# Patient Record
Sex: MALE | Race: WHITE | Employment: OTHER | URBAN - METROPOLITAN AREA
[De-identification: names, ages, dates, MRNs, and addresses within clinical notes are randomized per-mention and may not be internally consistent; named-entity substitution may affect disease eponyms.]

---

## 2020-09-21 ENCOUNTER — APPOINTMENT (OUTPATIENT)
Dept: GENERAL RADIOLOGY | Age: 60
End: 2020-09-21
Attending: PHYSICIAN ASSISTANT
Payer: COMMERCIAL

## 2020-09-21 ENCOUNTER — HOSPITAL ENCOUNTER (EMERGENCY)
Age: 60
Discharge: HOME | End: 2020-09-21
Attending: EMERGENCY MEDICINE | Admitting: EMERGENCY MEDICINE
Payer: COMMERCIAL

## 2020-09-21 ENCOUNTER — APPOINTMENT (OUTPATIENT)
Dept: CT IMAGING | Age: 60
End: 2020-09-21
Attending: PHYSICIAN ASSISTANT
Payer: COMMERCIAL

## 2020-09-21 VITALS
HEIGHT: 72 IN | TEMPERATURE: 98.4 F | OXYGEN SATURATION: 97 % | RESPIRATION RATE: 18 BRPM | BODY MASS INDEX: 23.7 KG/M2 | DIASTOLIC BLOOD PRESSURE: 74 MMHG | SYSTOLIC BLOOD PRESSURE: 133 MMHG | WEIGHT: 175 LBS | HEART RATE: 69 BPM

## 2020-09-21 DIAGNOSIS — R07.9 CHEST PAIN, UNSPECIFIED TYPE: Primary | ICD-10-CM

## 2020-09-21 DIAGNOSIS — M54.50 ACUTE RIGHT-SIDED LOW BACK PAIN WITHOUT SCIATICA: ICD-10-CM

## 2020-09-21 LAB
ALBUMIN SERPL-MCNC: 3.2 G/DL (ref 3.5–5)
ALBUMIN/GLOB SERPL: 0.7 {RATIO} (ref 1.1–2.2)
ALP SERPL-CCNC: 543 U/L (ref 45–117)
ALT SERPL-CCNC: 228 U/L (ref 12–78)
ANION GAP SERPL CALC-SCNC: 2 MMOL/L (ref 5–15)
APPEARANCE UR: CLEAR
AST SERPL-CCNC: 134 U/L (ref 15–37)
BACTERIA URNS QL MICRO: NEGATIVE /HPF
BASOPHILS # BLD: 0 K/UL (ref 0–0.1)
BASOPHILS NFR BLD: 0 % (ref 0–1)
BILIRUB SERPL-MCNC: 0.4 MG/DL (ref 0.2–1)
BILIRUB UR QL: NEGATIVE
BUN SERPL-MCNC: 15 MG/DL (ref 6–20)
BUN/CREAT SERPL: 16 (ref 12–20)
CALCIUM SERPL-MCNC: 9.6 MG/DL (ref 8.5–10.1)
CHLORIDE SERPL-SCNC: 103 MMOL/L (ref 97–108)
CO2 SERPL-SCNC: 30 MMOL/L (ref 21–32)
COLOR UR: ABNORMAL
COMMENT, HOLDF: NORMAL
CREAT SERPL-MCNC: 0.94 MG/DL (ref 0.7–1.3)
D DIMER PPP FEU-MCNC: 2.02 MG/L FEU (ref 0–0.65)
DIFFERENTIAL METHOD BLD: NORMAL
EOSINOPHIL # BLD: 0.1 K/UL (ref 0–0.4)
EOSINOPHIL NFR BLD: 1 % (ref 0–7)
EPITH CASTS URNS QL MICRO: ABNORMAL /LPF
ERYTHROCYTE [DISTWIDTH] IN BLOOD BY AUTOMATED COUNT: 13.7 % (ref 11.5–14.5)
GLOBULIN SER CALC-MCNC: 4.6 G/DL (ref 2–4)
GLUCOSE SERPL-MCNC: 113 MG/DL (ref 65–100)
GLUCOSE UR STRIP.AUTO-MCNC: NEGATIVE MG/DL
HCT VFR BLD AUTO: 44.5 % (ref 36.6–50.3)
HGB BLD-MCNC: 14.3 G/DL (ref 12.1–17)
HGB UR QL STRIP: ABNORMAL
HYALINE CASTS URNS QL MICRO: ABNORMAL /LPF (ref 0–5)
IMM GRANULOCYTES # BLD AUTO: 0 K/UL (ref 0–0.04)
IMM GRANULOCYTES NFR BLD AUTO: 0 % (ref 0–0.5)
KETONES UR QL STRIP.AUTO: NEGATIVE MG/DL
LEUKOCYTE ESTERASE UR QL STRIP.AUTO: NEGATIVE
LYMPHOCYTES # BLD: 0.9 K/UL (ref 0.8–3.5)
LYMPHOCYTES NFR BLD: 18 % (ref 12–49)
MCH RBC QN AUTO: 30.4 PG (ref 26–34)
MCHC RBC AUTO-ENTMCNC: 32.1 G/DL (ref 30–36.5)
MCV RBC AUTO: 94.7 FL (ref 80–99)
MONOCYTES # BLD: 0.6 K/UL (ref 0–1)
MONOCYTES NFR BLD: 11 % (ref 5–13)
NEUTS SEG # BLD: 3.5 K/UL (ref 1.8–8)
NEUTS SEG NFR BLD: 70 % (ref 32–75)
NITRITE UR QL STRIP.AUTO: NEGATIVE
NRBC # BLD: 0 K/UL (ref 0–0.01)
NRBC BLD-RTO: 0 PER 100 WBC
PH UR STRIP: 5.5 [PH] (ref 5–8)
PLATELET # BLD AUTO: 202 K/UL (ref 150–400)
PMV BLD AUTO: 10.9 FL (ref 8.9–12.9)
POTASSIUM SERPL-SCNC: 4.3 MMOL/L (ref 3.5–5.1)
PROT SERPL-MCNC: 7.8 G/DL (ref 6.4–8.2)
PROT UR STRIP-MCNC: NEGATIVE MG/DL
RBC # BLD AUTO: 4.7 M/UL (ref 4.1–5.7)
RBC #/AREA URNS HPF: ABNORMAL /HPF (ref 0–5)
SAMPLES BEING HELD,HOLD: NORMAL
SODIUM SERPL-SCNC: 135 MMOL/L (ref 136–145)
SP GR UR REFRACTOMETRY: 1.01 (ref 1–1.03)
TROPONIN I SERPL-MCNC: <0.05 NG/ML
UR CULT HOLD, URHOLD: NORMAL
UROBILINOGEN UR QL STRIP.AUTO: 0.2 EU/DL (ref 0.2–1)
WBC # BLD AUTO: 5 K/UL (ref 4.1–11.1)
WBC URNS QL MICRO: ABNORMAL /HPF (ref 0–4)

## 2020-09-21 PROCEDURE — 99283 EMERGENCY DEPT VISIT LOW MDM: CPT

## 2020-09-21 PROCEDURE — 71275 CT ANGIOGRAPHY CHEST: CPT

## 2020-09-21 PROCEDURE — 74011250636 HC RX REV CODE- 250/636: Performed by: PHYSICIAN ASSISTANT

## 2020-09-21 PROCEDURE — 80053 COMPREHEN METABOLIC PANEL: CPT

## 2020-09-21 PROCEDURE — 74176 CT ABD & PELVIS W/O CONTRAST: CPT

## 2020-09-21 PROCEDURE — 96374 THER/PROPH/DIAG INJ IV PUSH: CPT

## 2020-09-21 PROCEDURE — 71046 X-RAY EXAM CHEST 2 VIEWS: CPT

## 2020-09-21 PROCEDURE — 85025 COMPLETE CBC W/AUTO DIFF WBC: CPT

## 2020-09-21 PROCEDURE — 74011000258 HC RX REV CODE- 258: Performed by: RADIOLOGY

## 2020-09-21 PROCEDURE — 81001 URINALYSIS AUTO W/SCOPE: CPT

## 2020-09-21 PROCEDURE — 85379 FIBRIN DEGRADATION QUANT: CPT

## 2020-09-21 PROCEDURE — 84484 ASSAY OF TROPONIN QUANT: CPT

## 2020-09-21 PROCEDURE — 74011000636 HC RX REV CODE- 636: Performed by: RADIOLOGY

## 2020-09-21 PROCEDURE — 36415 COLL VENOUS BLD VENIPUNCTURE: CPT

## 2020-09-21 PROCEDURE — 99284 EMERGENCY DEPT VISIT MOD MDM: CPT

## 2020-09-21 RX ORDER — SODIUM CHLORIDE 0.9 % (FLUSH) 0.9 %
10 SYRINGE (ML) INJECTION
Status: COMPLETED | OUTPATIENT
Start: 2020-09-21 | End: 2020-09-21

## 2020-09-21 RX ORDER — KETOROLAC TROMETHAMINE 30 MG/ML
15 INJECTION, SOLUTION INTRAMUSCULAR; INTRAVENOUS
Status: COMPLETED | OUTPATIENT
Start: 2020-09-21 | End: 2020-09-21

## 2020-09-21 RX ADMIN — IOPAMIDOL 80 ML: 755 INJECTION, SOLUTION INTRAVENOUS at 12:19

## 2020-09-21 RX ADMIN — Medication 10 ML: at 12:19

## 2020-09-21 RX ADMIN — SODIUM CHLORIDE 100 ML: 900 INJECTION, SOLUTION INTRAVENOUS at 12:19

## 2020-09-21 RX ADMIN — KETOROLAC TROMETHAMINE 15 MG: 30 INJECTION, SOLUTION INTRAMUSCULAR at 10:45

## 2020-09-21 NOTE — ED PROVIDER NOTES
Date of Service:  9/21/2020    Patient:  Job Stevens    Chief Complaint:  Back Pain and Chest Pain       HPI:  Job Stevens is a 62 y.o.  male who presents for evaluation of right back pain, and chest pain, reminds him of kidney stone pain. Has been followed by PCP for the past month who has been evaluting him for fatigue, fevers, night sweats. Tested him for COVID 19, rheumatoid arthritis, lyme, thyroid had noticed elevated LFTs, has scheduled for CT scan of gallbladder. 3 days of right sided flank pain, feels similar to previous kidney stones, unable to get comfortable. Along with midsternal chest pain, nonradiating, feels like constriction, intermittent, no aggravating/alleviating, not worse with exertion, with some associated SOB. No nausea, vomiting, diarrhea, constipation, dysuria, hematuria. PMH: Kidney stones, HTN  Surgeries: hernia repair in July NKDA  No smoking/no drugs  Working - self employed           Past Medical History:   Diagnosis Date    Hypertension        History reviewed. No pertinent surgical history. History reviewed. No pertinent family history. Social History     Socioeconomic History    Marital status: Not on file     Spouse name: Not on file    Number of children: Not on file    Years of education: Not on file    Highest education level: Not on file   Occupational History    Not on file   Social Needs    Financial resource strain: Not on file    Food insecurity     Worry: Not on file     Inability: Not on file    Transportation needs     Medical: Not on file     Non-medical: Not on file   Tobacco Use    Smoking status: Never Smoker    Smokeless tobacco: Never Used   Substance and Sexual Activity    Alcohol use:  Yes    Drug use: Not on file    Sexual activity: Not on file   Lifestyle    Physical activity     Days per week: Not on file     Minutes per session: Not on file    Stress: Not on file   Relationships    Social connections     Talks on phone: Not on file     Gets together: Not on file     Attends Anabaptist service: Not on file     Active member of club or organization: Not on file     Attends meetings of clubs or organizations: Not on file     Relationship status: Not on file    Intimate partner violence     Fear of current or ex partner: Not on file     Emotionally abused: Not on file     Physically abused: Not on file     Forced sexual activity: Not on file   Other Topics Concern    Not on file   Social History Narrative    Not on file         ALLERGIES: Patient has no known allergies. Review of Systems   Constitutional: Negative for chills and fever. HENT: Negative for congestion and sore throat. Eyes: Negative for pain. Respiratory: Positive for shortness of breath. Negative for cough. Cardiovascular: Positive for chest pain. Negative for palpitations. Gastrointestinal: Negative for abdominal pain, diarrhea, nausea and vomiting. Genitourinary: Negative for dysuria, frequency and urgency. Musculoskeletal: Positive for back pain. Negative for neck pain. Neurological: Negative for dizziness, light-headedness and headaches. Vitals:    09/21/20 0958   BP: 130/80   Pulse: 69   Resp: 16   Temp: 98.1 °F (36.7 °C)   SpO2: 99%   Weight: 79.4 kg (175 lb)   Height: 6' (1.829 m)            Physical Exam  Vitals signs and nursing note reviewed. Constitutional:       Appearance: Normal appearance. HENT:      Head: Normocephalic and atraumatic. Nose: Nose normal.   Eyes:      Extraocular Movements: Extraocular movements intact. Conjunctiva/sclera: Conjunctivae normal.      Pupils: Pupils are equal, round, and reactive to light. Neck:      Musculoskeletal: Normal range of motion and neck supple. Cardiovascular:      Rate and Rhythm: Normal rate and regular rhythm. Pulses: Normal pulses. Radial pulses are 2+ on the right side and 2+ on the left side.         Posterior tibial pulses are 2+ on the right side and 2+ on the left side. Heart sounds: Normal heart sounds. Pulmonary:      Effort: Pulmonary effort is normal.      Breath sounds: Normal breath sounds. Abdominal:      General: Abdomen is flat. Bowel sounds are normal.      Palpations: Abdomen is soft. Tenderness: There is right CVA tenderness. There is no left CVA tenderness, guarding or rebound. Musculoskeletal: Normal range of motion. Skin:     General: Skin is warm and dry. Neurological:      General: No focal deficit present. Mental Status: He is alert and oriented to person, place, and time. Mental status is at baseline. Sensory: Sensation is intact. Motor: Motor function is intact. Coordination: Coordination is intact. Psychiatric:         Mood and Affect: Mood normal.         Behavior: Behavior normal.         Thought Content: Thought content normal.          MDM  Number of Diagnoses or Management Options  Acute right-sided low back pain without sciatica:   Chest pain, unspecified type:   Diagnosis management comments: LABS:  Recent Results (from the past 6 hour(s))  -SAMPLES BEING HELD  Collection Time: 09/21/20 10:35 AM       Result                      Value             Ref Range           SAMPLES BEING HELD          1RED                                  COMMENT                                                       Add-on orders for these samples will be processed based on acceptable specimen integrity and analyte stability, which may vary by analyte.   -CBC WITH AUTOMATED DIFF  Collection Time: 09/21/20 10:35 AM       Result                      Value             Ref Range           WBC                         5.0               4.1 - 11.1 K*       RBC                         4.70              4.10 - 5.70 *       HGB                         14.3              12.1 - 17.0 *       HCT                         44.5              36.6 - 50.3 %       MCV                         94.7              80.0 - 99.0 *       MCH 30.4              26.0 - 34.0 *       MCHC                        32.1              30.0 - 36.5 *       RDW                         13.7              11.5 - 14.5 %       PLATELET                    202               150 - 400 K/*       MPV                         10.9              8.9 - 12.9 FL       NRBC                        0.0               0  WBC       ABSOLUTE NRBC               0.00              0.00 - 0.01 *       NEUTROPHILS                 70                32 - 75 %           LYMPHOCYTES                 18                12 - 49 %           MONOCYTES                   11                5 - 13 %            EOSINOPHILS                 1                 0 - 7 %             BASOPHILS                   0                 0 - 1 %             IMMATURE GRANULOCYTES       0                 0.0 - 0.5 %         ABS. NEUTROPHILS            3.5               1.8 - 8.0 K/*       ABS. LYMPHOCYTES            0.9               0.8 - 3.5 K/*       ABS. MONOCYTES              0.6               0.0 - 1.0 K/*       ABS. EOSINOPHILS            0.1               0.0 - 0.4 K/*       ABS. BASOPHILS              0.0               0.0 - 0.1 K/*       ABS. IMM.  GRANS.            0.0               0.00 - 0.04 *       DF                          AUTOMATED                        -D DIMER  Collection Time: 09/21/20 10:35 AM       Result                      Value             Ref Range           D-dimer                     2.02 (H)          0.00 - 2.79 *  -METABOLIC PANEL, COMPREHENSIVE  Collection Time: 09/21/20 10:35 AM       Result                      Value             Ref Range           Sodium                      135 (L)           136 - 145 mm*       Potassium                   4.3               3.5 - 5.1 mm*       Chloride                    103               97 - 108 mmo*       CO2                         30                21 - 32 mmol*       Anion gap                   2 (L)             5 - 15 mmol/L       Glucose                     113 (H)           65 - 100 mg/*       BUN                         15                6 - 20 MG/DL        Creatinine                  0.94              0.70 - 1.30 *       BUN/Creatinine ratio        16                12 - 20             GFR est AA                  >60               >60 ml/min/1*       GFR est non-AA              >60               >60 ml/min/1*       Calcium                     9.6               8.5 - 10.1 M*       Bilirubin, total            0.4               0.2 - 1.0 MG*       ALT (SGPT)                  228 (H)           12 - 78 U/L         AST (SGOT)                  134 (H)           15 - 37 U/L         Alk.  phosphatase            543 (H)           45 - 117 U/L        Protein, total              7.8               6.4 - 8.2 g/*       Albumin                     3.2 (L)           3.5 - 5.0 g/*       Globulin                    4.6 (H)           2.0 - 4.0 g/*       A-G Ratio                   0.7 (L)           1.1 - 2.2      -TROPONIN I  Collection Time: 09/21/20 10:35 AM       Result                      Value             Ref Range           Troponin-I, Qt.             <0.05             <0.05 ng/mL    -URINALYSIS W/MICROSCOPIC  Collection Time: 09/21/20 10:45 AM       Result                      Value             Ref Range           Color                       YELLOW/STRAW                          Appearance                  CLEAR             CLEAR               Specific gravity            1.010             1.003 - 1.03*       pH (UA)                     5.5               5.0 - 8.0           Protein                     Negative          NEG mg/dL           Glucose                     Negative          NEG mg/dL           Ketone                      Negative          NEG mg/dL           Bilirubin                   Negative          NEG                 Blood                       TRACE (A)         NEG                 Urobilinogen                0.2 0.2 - 1.0 EU*       Nitrites                    Negative          NEG                 Leukocyte Esterase          Negative          NEG                 WBC                         0-4               0 - 4 /hpf          RBC                         0-5               0 - 5 /hpf          Epithelial cells            FEW               FEW /lpf            Bacteria                    Negative          NEG /hpf            Hyaline cast                0-2               0 - 5 /lpf     -URINE CULTURE HOLD SAMPLE  Collection Time: 09/21/20 10:45 AM  Specimen: Serum; Urine       Result                      Value             Ref Range           Urine culture hold                                            Urine on hold in Microbiology dept for 2 days. If unpreserved urine is submitted, it cannot be used for addtional testing after 24 hours, recollection will be required. IMAGING:  CTA CHEST W OR W WO CONT   Final Result    IMPRESSION: No acute process or evidence of pulmonary embolism. Nonobstructing    left renal stone. CT ABD PELV WO CONT   Final Result    IMPRESSION:    Bilateral nonobstructing renal stones. No ureteral stone or hydronephrosis. No    acute abnormality. XR CHEST PA LAT   Final Result    Impression: No acute process. Medications During Visit:  Medications  ketorolac (TORADOL) injection 15 mg (15 mg IntraVENous Given 9/21/20 1045)  sodium chloride 0.9 % bolus infusion 100 mL (0 mL IntraVENous IV Completed 9/21/20 1339)  iopamidoL (ISOVUE-370) 76 % injection 100 mL (80 mL IntraVENous Given 9/21/20 1219)  sodium chloride (NS) flush 10 mL (10 mL IntraVENous Given 9/21/20 1219)      DECISION MAKING:  Colt Randolph is a 62 y.o. male who comes in as above. 3 days right flank pain, h/o kidney stones, states this feels similar, nonradiating, no bowel/bladder incontinence, no numbness/tingling/weakness.  Also endorses midsternal nonradiating chest pain, with associated SOB, no n/v/d, no dysuria, no hematuria. Vitals stable. Patient in no acute distress. Blood work shows no clear etiology. D-dimer elevated, CTA shows no PE. CT scan abd/pelv neg shows nonobstructing renal stones. Discussed all results with patient, patient feels improved with toradol. Will continue trial of NSAIDS for back pain, with close follow up with PCP within 1 week. Return to ED if any worsening or severe symptoms. IMPRESSION:  Chest pain, unspecified type  (primary encounter diagnosis)  Acute right-sided low back pain without sciatica    DISPOSITION:  Discharged      There are no discharge medications for this patient. Follow-up Information     Follow up With Specialties Details Why Contact Info    Deandre Ching MD Family Medicine, Family Medicine Schedule an   appointment as soon as possible for a visit in 1 week  [address] [facility] [id number] [phone]      [address] [facility] Emergency Medicine Go to  If symptoms worsen [address] [facility]  [phone]          The patient is asked to follow-up with their primary care provider in the next several days. They are to call tomorrow for an appointment. The patient is asked to return promptly for any increased concerns or worsening of symptoms. They can return to this emergency department or any other emergency department.            Procedures

## 2020-09-21 NOTE — ED TRIAGE NOTES
Patient reports not feeling well for weeks. Saw PCP last week and PCP thinks \"there is something wrong with my gallbladder. In a couple weeks im scheduled to get a CT on my gallbladder. \" Reports midline chest pain, low back pain, and trouble fully emptying bladder. Denies shortness of breath.

## 2020-09-26 ENCOUNTER — HOSPITAL ENCOUNTER (EMERGENCY)
Age: 60
Discharge: HOME | End: 2020-09-26
Attending: STUDENT IN AN ORGANIZED HEALTH CARE EDUCATION/TRAINING PROGRAM

## 2020-09-26 VITALS
SYSTOLIC BLOOD PRESSURE: 154 MMHG | OXYGEN SATURATION: 96 % | TEMPERATURE: 98.6 F | DIASTOLIC BLOOD PRESSURE: 89 MMHG | HEART RATE: 76 BPM | RESPIRATION RATE: 16 BRPM

## 2020-09-26 DIAGNOSIS — M25.50 ARTHRALGIA, UNSPECIFIED JOINT: ICD-10-CM

## 2020-09-26 DIAGNOSIS — A69.20 LYME DISEASE: Primary | ICD-10-CM

## 2020-09-26 DIAGNOSIS — R79.89 ELEVATED LFTS: ICD-10-CM

## 2020-09-26 DIAGNOSIS — L27.0 DRUG RASH: ICD-10-CM

## 2020-09-26 LAB
ALBUMIN SERPL-MCNC: 3.2 G/DL (ref 3.5–5)
ALBUMIN/GLOB SERPL: 0.6 {RATIO} (ref 1.1–2.2)
ALP SERPL-CCNC: 603 U/L (ref 45–117)
ALT SERPL-CCNC: 267 U/L (ref 12–78)
ANION GAP SERPL CALC-SCNC: 3 MMOL/L (ref 5–15)
APPEARANCE UR: ABNORMAL
AST SERPL-CCNC: 154 U/L (ref 15–37)
BACTERIA URNS QL MICRO: NEGATIVE /HPF
BASOPHILS # BLD: 0 K/UL (ref 0–0.1)
BASOPHILS NFR BLD: 0 % (ref 0–1)
BILIRUB SERPL-MCNC: 0.6 MG/DL (ref 0.2–1)
BILIRUB UR QL: NEGATIVE
BUN SERPL-MCNC: 15 MG/DL (ref 6–20)
BUN/CREAT SERPL: 18 (ref 12–20)
CALCIUM SERPL-MCNC: 9.6 MG/DL (ref 8.5–10.1)
CAOX CRY URNS QL MICRO: ABNORMAL
CHLORIDE SERPL-SCNC: 102 MMOL/L (ref 97–108)
CO2 SERPL-SCNC: 31 MMOL/L (ref 21–32)
COLOR UR: ABNORMAL
COMMENT, HOLDF: NORMAL
CREAT SERPL-MCNC: 0.85 MG/DL (ref 0.7–1.3)
DIFFERENTIAL METHOD BLD: NORMAL
EOSINOPHIL # BLD: 0.1 K/UL (ref 0–0.4)
EOSINOPHIL NFR BLD: 1 % (ref 0–7)
EPITH CASTS URNS QL MICRO: ABNORMAL /LPF
ERYTHROCYTE [DISTWIDTH] IN BLOOD BY AUTOMATED COUNT: 14 % (ref 11.5–14.5)
GLOBULIN SER CALC-MCNC: 5 G/DL (ref 2–4)
GLUCOSE SERPL-MCNC: 102 MG/DL (ref 65–100)
GLUCOSE UR STRIP.AUTO-MCNC: NEGATIVE MG/DL
HCT VFR BLD AUTO: 42.8 % (ref 36.6–50.3)
HGB BLD-MCNC: 14.1 G/DL (ref 12.1–17)
HGB UR QL STRIP: ABNORMAL
IMM GRANULOCYTES # BLD AUTO: 0 K/UL (ref 0–0.04)
IMM GRANULOCYTES NFR BLD AUTO: 0 % (ref 0–0.5)
KETONES UR QL STRIP.AUTO: ABNORMAL MG/DL
LEUKOCYTE ESTERASE UR QL STRIP.AUTO: ABNORMAL
LIPASE SERPL-CCNC: 65 U/L (ref 73–393)
LYMPHOCYTES # BLD: 1 K/UL (ref 0.8–3.5)
LYMPHOCYTES NFR BLD: 13 % (ref 12–49)
MAGNESIUM SERPL-MCNC: 2 MG/DL (ref 1.6–2.4)
MCH RBC QN AUTO: 30.5 PG (ref 26–34)
MCHC RBC AUTO-ENTMCNC: 32.9 G/DL (ref 30–36.5)
MCV RBC AUTO: 92.4 FL (ref 80–99)
MONOCYTES # BLD: 0.8 K/UL (ref 0–1)
MONOCYTES NFR BLD: 11 % (ref 5–13)
NEUTS SEG # BLD: 5.7 K/UL (ref 1.8–8)
NEUTS SEG NFR BLD: 75 % (ref 32–75)
NITRITE UR QL STRIP.AUTO: NEGATIVE
NRBC # BLD: 0 K/UL (ref 0–0.01)
NRBC BLD-RTO: 0 PER 100 WBC
PH UR STRIP: 6 [PH] (ref 5–8)
PLATELET # BLD AUTO: 285 K/UL (ref 150–400)
PMV BLD AUTO: 10.8 FL (ref 8.9–12.9)
POTASSIUM SERPL-SCNC: 3.8 MMOL/L (ref 3.5–5.1)
PROT SERPL-MCNC: 8.2 G/DL (ref 6.4–8.2)
PROT UR STRIP-MCNC: 30 MG/DL
RBC # BLD AUTO: 4.63 M/UL (ref 4.1–5.7)
RBC #/AREA URNS HPF: ABNORMAL /HPF (ref 0–5)
SAMPLES BEING HELD,HOLD: NORMAL
SODIUM SERPL-SCNC: 136 MMOL/L (ref 136–145)
SP GR UR REFRACTOMETRY: 1.02 (ref 1–1.03)
TROPONIN I SERPL-MCNC: <0.05 NG/ML
UR CULT HOLD, URHOLD: NORMAL
UROBILINOGEN UR QL STRIP.AUTO: 1 EU/DL (ref 0.2–1)
WBC # BLD AUTO: 7.6 K/UL (ref 4.1–11.1)
WBC URNS QL MICRO: ABNORMAL /HPF (ref 0–4)

## 2020-09-26 PROCEDURE — 83735 ASSAY OF MAGNESIUM: CPT

## 2020-09-26 PROCEDURE — 74011000258 HC RX REV CODE- 258: Performed by: PHYSICIAN ASSISTANT

## 2020-09-26 PROCEDURE — 74011250637 HC RX REV CODE- 250/637: Performed by: PHYSICIAN ASSISTANT

## 2020-09-26 PROCEDURE — 80053 COMPREHEN METABOLIC PANEL: CPT

## 2020-09-26 PROCEDURE — 96375 TX/PRO/DX INJ NEW DRUG ADDON: CPT

## 2020-09-26 PROCEDURE — 81001 URINALYSIS AUTO W/SCOPE: CPT

## 2020-09-26 PROCEDURE — 84484 ASSAY OF TROPONIN QUANT: CPT

## 2020-09-26 PROCEDURE — 96372 THER/PROPH/DIAG INJ SC/IM: CPT

## 2020-09-26 PROCEDURE — 74011250636 HC RX REV CODE- 250/636: Performed by: PHYSICIAN ASSISTANT

## 2020-09-26 PROCEDURE — 36415 COLL VENOUS BLD VENIPUNCTURE: CPT

## 2020-09-26 PROCEDURE — 96365 THER/PROPH/DIAG IV INF INIT: CPT

## 2020-09-26 PROCEDURE — 99284 EMERGENCY DEPT VISIT MOD MDM: CPT

## 2020-09-26 PROCEDURE — 85025 COMPLETE CBC W/AUTO DIFF WBC: CPT

## 2020-09-26 PROCEDURE — 93005 ELECTROCARDIOGRAM TRACING: CPT

## 2020-09-26 PROCEDURE — 83690 ASSAY OF LIPASE: CPT

## 2020-09-26 RX ORDER — HYDROMORPHONE HYDROCHLORIDE 2 MG/1
2 TABLET ORAL
Qty: 10 TAB | Refills: 0 | Status: SHIPPED | OUTPATIENT
Start: 2020-09-26 | End: 2020-09-28

## 2020-09-26 RX ORDER — HYDROMORPHONE HYDROCHLORIDE 1 MG/ML
INJECTION, SOLUTION INTRAMUSCULAR; INTRAVENOUS; SUBCUTANEOUS
Status: DISPENSED
Start: 2020-09-26 | End: 2020-09-27

## 2020-09-26 RX ORDER — HYDROMORPHONE HYDROCHLORIDE 2 MG/1
TABLET ORAL
Status: DISPENSED
Start: 2020-09-26 | End: 2020-09-27

## 2020-09-26 RX ORDER — HYDROMORPHONE HYDROCHLORIDE 2 MG/1
2 TABLET ORAL
Status: COMPLETED | OUTPATIENT
Start: 2020-09-26 | End: 2020-09-26

## 2020-09-26 RX ORDER — SODIUM CHLORIDE 900 MG/100ML
INJECTION INTRAVENOUS
Status: DISPENSED
Start: 2020-09-26 | End: 2020-09-27

## 2020-09-26 RX ORDER — CEFUROXIME AXETIL 500 MG/1
500 TABLET ORAL 2 TIMES DAILY
Qty: 42 TAB | Refills: 0 | Status: SHIPPED | OUTPATIENT
Start: 2020-09-26 | End: 2020-10-17

## 2020-09-26 RX ORDER — KETOROLAC TROMETHAMINE 30 MG/ML
30 INJECTION, SOLUTION INTRAMUSCULAR; INTRAVENOUS
Status: COMPLETED | OUTPATIENT
Start: 2020-09-26 | End: 2020-09-26

## 2020-09-26 RX ORDER — KETOROLAC TROMETHAMINE 30 MG/ML
INJECTION, SOLUTION INTRAMUSCULAR; INTRAVENOUS
Status: DISPENSED
Start: 2020-09-26 | End: 2020-09-27

## 2020-09-26 RX ORDER — CEFTRIAXONE 2 G/1
INJECTION, POWDER, FOR SOLUTION INTRAMUSCULAR; INTRAVENOUS
Status: DISPENSED
Start: 2020-09-26 | End: 2020-09-27

## 2020-09-26 RX ORDER — HYDROMORPHONE HYDROCHLORIDE 1 MG/ML
1 INJECTION, SOLUTION INTRAMUSCULAR; INTRAVENOUS; SUBCUTANEOUS
Status: COMPLETED | OUTPATIENT
Start: 2020-09-26 | End: 2020-09-26

## 2020-09-26 RX ADMIN — CEFTRIAXONE SODIUM 2 G: 2 INJECTION, POWDER, FOR SOLUTION INTRAMUSCULAR; INTRAVENOUS at 21:11

## 2020-09-26 RX ADMIN — HYDROMORPHONE HYDROCHLORIDE 2 MG: 2 TABLET ORAL at 20:54

## 2020-09-26 RX ADMIN — KETOROLAC TROMETHAMINE 30 MG: 30 INJECTION, SOLUTION INTRAMUSCULAR at 20:54

## 2020-09-26 RX ADMIN — SODIUM CHLORIDE 1000 ML: 9 INJECTION, SOLUTION INTRAVENOUS at 20:21

## 2020-09-26 RX ADMIN — HYDROMORPHONE HYDROCHLORIDE 1 MG: 1 INJECTION, SOLUTION INTRAMUSCULAR; INTRAVENOUS; SUBCUTANEOUS at 22:24

## 2020-09-26 NOTE — ED PROVIDER NOTES
59yo male with PMH of HTN, recent lyme disease diagnosis presents with generalized rash x yesterday, worsening bilateral hip, knee, elbow joint pain despite oxycodone. He states sx's began 2-3 weeks ago with generalized fatigue, bilateral knee and elbow joint pain and intermittent fever. Saw PCP who ordered multiple labs, checked for RA, mono, hepatitis panel all of which were negative. Over the last weekend had \"flares\" of back pain and was seen in the ED on Monday. Also c/o chest pain intermittent for 1 week, states it was constant, had labs, CT chest/abd/pelvis had neg CXR, neg CTA chest, bilateral non-obstructing renal stones, had elevated LFTs. Lyme disease resulted positive earlier this week. States he has \"flares\" where he has body aches but resolves, but states \"costant body pain 24/7\". States he had an ABD US on Wed, ordered by GI, 3 days ago on Wed, states his PCP read it and was told it was normal. He saw GI Dr. Kwan Sanchez, on Thursday, was told to get an MRI and states \"he told me there might be a clogged bile duct\". Had labs drawn, and alk phos was increasing at 643, LFTs trending down now compared to ED visits labs. States intermittent headaches for the duration of sx's, 2-3 weeks, states currently no headache. He has had no fever for \"a few days now\". States lyme disease IgG/IgM Ab is high, at 1.14. States in July he was on [place] in [place] which is \"known for Lyme disease\" but never developed a rash or sx's until 2-3 weeks ago. He was started on doxycycline 100mg BID x 3 weeks 3 days ago by his PCP, rash developed yesterday, was pruritic but not currently. Has not gotten the ABD MRI yet. Past Medical History:   Diagnosis Date    Hypertension        No past surgical history on file. No family history on file.     Social History     Socioeconomic History    Marital status: SINGLE     Spouse name: Not on file    Number of children: Not on file    Years of education: Not on file    Highest education level: Not on file   Occupational History    Not on file   Social Needs    Financial resource strain: Not on file    Food insecurity     Worry: Not on file     Inability: Not on file    Transportation needs     Medical: Not on file     Non-medical: Not on file   Tobacco Use    Smoking status: Never Smoker    Smokeless tobacco: Never Used   Substance and Sexual Activity    Alcohol use: Yes    Drug use: Not on file    Sexual activity: Not on file   Lifestyle    Physical activity     Days per week: Not on file     Minutes per session: Not on file    Stress: Not on file   Relationships    Social connections     Talks on phone: Not on file     Gets together: Not on file     Attends Taoist service: Not on file     Active member of club or organization: Not on file     Attends meetings of clubs or organizations: Not on file     Relationship status: Not on file    Intimate partner violence     Fear of current or ex partner: Not on file     Emotionally abused: Not on file     Physically abused: Not on file     Forced sexual activity: Not on file   Other Topics Concern    Not on file   Social History Narrative    Not on file         ALLERGIES: Patient has no known allergies. Review of Systems   Constitutional: Negative. Negative for activity change, chills, fatigue and unexpected weight change. HENT: Negative for trouble swallowing. Respiratory: Negative for cough, chest tightness, shortness of breath and wheezing. Cardiovascular: Negative. Negative for chest pain and palpitations. Gastrointestinal: Negative. Negative for abdominal pain, diarrhea, nausea and vomiting. Genitourinary: Negative. Negative for dysuria, flank pain, frequency and hematuria. Musculoskeletal: Positive for arthralgias. Negative for back pain, neck pain and neck stiffness. Skin: Positive for rash. Negative for color change. Neurological: Negative.   Negative for dizziness, numbness and headaches. All other systems reviewed and are negative. Vitals:    09/26/20 1929   BP: (!) 153/83   Pulse: 82   Resp: 18   Temp: 98.4 °F (36.9 °C)   SpO2: 100%            Physical Exam  Vitals signs and nursing note reviewed. Constitutional:       General: He is not in acute distress. Appearance: He is well-developed. He is not toxic-appearing or diaphoretic. Comments: WM   HENT:      Head: Normocephalic and atraumatic. Nose: Nose normal.      Mouth/Throat:      Mouth: Mucous membranes are moist.   Eyes:      General:         Right eye: No discharge. Left eye: No discharge. Conjunctiva/sclera: Conjunctivae normal.      Pupils: Pupils are equal, round, and reactive to light. Neck:      Musculoskeletal: Full passive range of motion without pain and normal range of motion. Trachea: No tracheal tenderness. Cardiovascular:      Rate and Rhythm: Normal rate and regular rhythm. Pulses: Normal pulses. Heart sounds: Normal heart sounds. No murmur. No friction rub. No gallop. Pulmonary:      Effort: Pulmonary effort is normal. No respiratory distress. Breath sounds: Normal breath sounds. No wheezing or rales. Chest:      Chest wall: No tenderness. Abdominal:      General: Bowel sounds are normal. There is no distension. Palpations: Abdomen is soft. Tenderness: There is no abdominal tenderness. There is no guarding or rebound. Musculoskeletal: Normal range of motion. General: No tenderness. Skin:     General: Skin is warm and dry. Capillary Refill: Capillary refill takes less than 2 seconds. Findings: Rash (mixture of raised/not raised macular rash to torso, arms and upper thighs, sparing palms/soles, + blanches; Dry scab to R posterior shoulder. No pruritis. No erythema migrans. ) present. No abrasion or erythema. Neurological:      General: No focal deficit present.       Mental Status: He is alert and oriented to person, place, and time. Cranial Nerves: No cranial nerve deficit. Sensory: No sensory deficit. Coordination: Coordination normal.   Psychiatric:         Speech: Speech normal.         Behavior: Behavior normal.                      MDM  Number of Diagnoses or Management Options  Diagnosis management comments:   Ddx: lyme disease, electrolyte abnormality, chronic arthralgia       Amount and/or Complexity of Data Reviewed  Clinical lab tests: ordered and reviewed  Tests in the radiology section of CPT®: ordered and reviewed  Review and summarize past medical records: yes  Discuss the patient with other providers: yes  Independent visualization of images, tracings, or specimens: yes (EKG)    Patient Progress  Patient progress: stable                        Procedures     EKG interpretation:   Rhythm: normal sinus rhythm; and regular . Rate (approx.): 70; Axis: normal; P wave: normal; QRS interval: normal ; ST/T wave: normal     I discussed patient's PMH, exam findings as well as careplan with the ER attending who agrees with care plan. Dr. Milan Mendoza recommends to stop doxycycline, give Rocephin 2g IV and discharge ceftin 500mg BID x 21 days. She recommends infectious disease and PCP follow-up. Hali Stovall PA-C    I reviewed patient's , he is prescribed Klonopin monthly and was given a prescription for Percocet 5325 3 days ago,#30 tablets which she states he has been taking 12 as directed and still having severe joint pain. I advised patient there is no indication for admission as he is able to walk, he is afebrile, no signs of sepsis. Advised if he were to be admitted for pain control he would not meet inpatient criteria and will be observation status which will likely have to be paid out of pocket. Offered a shot of IM Dilaudid here and a few tablets for home as it is Saturday night and the offices are open until Monday.   Since there is a confirmed Lyme disease diagnosis, and he is having difficulty managing his pain at this time we will give a few Dilaudid tablets p.o., instructed him to first take naproxen as directed and only take narcotic medication for breakthrough pain. LABORATORY TESTS:  Recent Results (from the past 12 hour(s))   URINALYSIS W/MICROSCOPIC    Collection Time: 09/26/20  8:18 PM   Result Value Ref Range    Color DARK YELLOW      Appearance CLOUDY (A) CLEAR      Specific gravity 1.021 1.003 - 1.030      pH (UA) 6.0 5.0 - 8.0      Protein 30 (A) NEG mg/dL    Glucose Negative NEG mg/dL    Ketone TRACE (A) NEG mg/dL    Bilirubin Negative NEG      Blood TRACE (A) NEG      Urobilinogen 1.0 0.2 - 1.0 EU/dL    Nitrites Negative NEG      Leukocyte Esterase SMALL (A) NEG      WBC 0-4 0 - 4 /hpf    RBC 0-5 0 - 5 /hpf    Epithelial cells FEW FEW /lpf    Bacteria Negative NEG /hpf    CA Oxalate crystals 1+ (A) NEG   URINE CULTURE HOLD SAMPLE    Collection Time: 09/26/20  8:18 PM    Specimen: Serum; Urine   Result Value Ref Range    Urine culture hold        Urine on hold in Microbiology dept for 2 days. If unpreserved urine is submitted, it cannot be used for addtional testing after 24 hours, recollection will be required. CBC WITH AUTOMATED DIFF    Collection Time: 09/26/20  8:19 PM   Result Value Ref Range    WBC 7.6 4.1 - 11.1 K/uL    RBC 4.63 4.10 - 5.70 M/uL    HGB 14.1 12.1 - 17.0 g/dL    HCT 42.8 36.6 - 50.3 %    MCV 92.4 80.0 - 99.0 FL    MCH 30.5 26.0 - 34.0 PG    MCHC 32.9 30.0 - 36.5 g/dL    RDW 14.0 11.5 - 14.5 %    PLATELET 487 049 - 639 K/uL    MPV 10.8 8.9 - 12.9 FL    NRBC 0.0 0  WBC    ABSOLUTE NRBC 0.00 0.00 - 0.01 K/uL    NEUTROPHILS 75 32 - 75 %    LYMPHOCYTES 13 12 - 49 %    MONOCYTES 11 5 - 13 %    EOSINOPHILS 1 0 - 7 %    BASOPHILS 0 0 - 1 %    IMMATURE GRANULOCYTES 0 0.0 - 0.5 %    ABS. NEUTROPHILS 5.7 1.8 - 8.0 K/UL    ABS. LYMPHOCYTES 1.0 0.8 - 3.5 K/UL    ABS. MONOCYTES 0.8 0.0 - 1.0 K/UL    ABS. EOSINOPHILS 0.1 0.0 - 0.4 K/UL    ABS.  BASOPHILS 0.0 0.0 - 0.1 K/UL ABS. IMM. GRANS. 0.0 0.00 - 0.04 K/UL    DF AUTOMATED     METABOLIC PANEL, COMPREHENSIVE    Collection Time: 09/26/20  8:19 PM   Result Value Ref Range    Sodium 136 136 - 145 mmol/L    Potassium 3.8 3.5 - 5.1 mmol/L    Chloride 102 97 - 108 mmol/L    CO2 31 21 - 32 mmol/L    Anion gap 3 (L) 5 - 15 mmol/L    Glucose 102 (H) 65 - 100 mg/dL    BUN 15 6 - 20 MG/DL    Creatinine 0.85 0.70 - 1.30 MG/DL    BUN/Creatinine ratio 18 12 - 20      GFR est AA >60 >60 ml/min/1.73m2    GFR est non-AA >60 >60 ml/min/1.73m2    Calcium 9.6 8.5 - 10.1 MG/DL    Bilirubin, total 0.6 0.2 - 1.0 MG/DL    ALT (SGPT) 267 (H) 12 - 78 U/L    AST (SGOT) 154 (H) 15 - 37 U/L    Alk. phosphatase 603 (H) 45 - 117 U/L    Protein, total 8.2 6.4 - 8.2 g/dL    Albumin 3.2 (L) 3.5 - 5.0 g/dL    Globulin 5.0 (H) 2.0 - 4.0 g/dL    A-G Ratio 0.6 (L) 1.1 - 2.2     LIPASE    Collection Time: 09/26/20  8:19 PM   Result Value Ref Range    Lipase 65 (L) 73 - 393 U/L   SAMPLES BEING HELD    Collection Time: 09/26/20  8:19 PM   Result Value Ref Range    SAMPLES BEING HELD BLUE,1RED     COMMENT        Add-on orders for these samples will be processed based on acceptable specimen integrity and analyte stability, which may vary by analyte.    TROPONIN I    Collection Time: 09/26/20  8:19 PM   Result Value Ref Range    Troponin-I, Qt. <0.05 <0.05 ng/mL   MAGNESIUM    Collection Time: 09/26/20  8:19 PM   Result Value Ref Range    Magnesium 2.0 1.6 - 2.4 mg/dL   EKG, 12 LEAD, INITIAL    Collection Time: 09/26/20  8:35 PM   Result Value Ref Range    Ventricular Rate 70 BPM    Atrial Rate 70 BPM    P-R Interval 144 ms    QRS Duration 92 ms    Q-T Interval 380 ms    QTC Calculation (Bezet) 410 ms    Calculated P Axis 61 degrees    Calculated R Axis 37 degrees    Calculated T Axis 64 degrees    Diagnosis Normal sinus rhythm  No previous ECGs available              MEDICATIONS GIVEN:  Medications   sodium chloride 0.9 % bolus infusion 1,000 mL (0 mL IntraVENous IV Completed 9/26/20 2125)   ketorolac (TORADOL) injection 30 mg (30 mg IntraVENous Given 9/26/20 2054)   HYDROmorphone (DILAUDID) tablet 2 mg (2 mg Oral Given 9/26/20 2054)   cefTRIAXone (ROCEPHIN) 2 g in 0.9% sodium chloride (MBP/ADV) 50 mL (0 g IntraVENous IV Completed 9/26/20 2221)   HYDROmorphone (PF) (DILAUDID) injection 1 mg (1 mg IntraMUSCular Given 9/26/20 2224)         DISCHARGE NOTE:  11:34 PM  The patient's results have been reviewed with them and/or available family. Patient and/or family verbally conveyed their understanding and agreement of the patient's signs, symptoms, diagnosis, treatment and prognosis and additionally agree to follow up as recommended in the discharge instructions or to return to the Emergency Room should their condition change prior to their follow-up appointment. The patient/family verbally agrees with the care-plan and verbally conveys that all of their questions have been answered. The discharge instructions have also been provided to the patient and/or family with some educational information regarding the patient's diagnosis as well a list of reasons why the patient would want to return to the ER prior to their follow-up appointment, should their condition change. Plan:  1. F/U with pcp, infectious disease  2.  Stop doxycycline, start ceftin  Return precautions discussed and advised to return to ER if worse

## 2020-09-26 NOTE — ED TRIAGE NOTES
Arrived from home c/o body pain, joint pain and muscle pain for several weeks. Patient tested positive for Lyme on Thursday. Today patient noticed hives on bilateral arms and trunk.

## 2020-09-27 LAB
ATRIAL RATE: 70 BPM
CALCULATED P AXIS, ECG09: 61 DEGREES
CALCULATED R AXIS, ECG10: 37 DEGREES
CALCULATED T AXIS, ECG11: 64 DEGREES
DIAGNOSIS, 93000: NORMAL
P-R INTERVAL, ECG05: 144 MS
Q-T INTERVAL, ECG07: 380 MS
QRS DURATION, ECG06: 92 MS
QTC CALCULATION (BEZET), ECG08: 410 MS
VENTRICULAR RATE, ECG03: 70 BPM

## 2020-09-27 NOTE — DISCHARGE INSTRUCTIONS
STOP doxycycline, start Ceftin twice a day for 21 days. Patient Education        Lyme Disease: Care Instructions  Your Care Instructions     Lyme disease is a bacterial infection spread by ticks. Antibiotics can treat Lyme disease. If you do not treat Lyme disease, it can lead to problems with your skin, joints, heart, and nervous system. These problems can develop weeks, months, or even years after you get the infection. Your doctor may prescribe antibiotics even if it is not yet certain that you have Lyme disease. Follow-up care is a key part of your treatment and safety. Be sure to make and go to all appointments, and call your doctor if you are having problems. It's also a good idea to know your test results and keep a list of the medicines you take. How can you care for yourself at home? · Take your antibiotics as directed. Do not stop taking them just because you feel better. You need to take the full course of antibiotics. · Take an over-the-counter pain medicine if needed, such as acetaminophen (Tylenol), ibuprofen (Advil, Motrin), or naproxen (Aleve). Read and follow all instructions on the label. To prevent Lyme disease in the future  · Avoid ticks:  ? Learn where ticks are found in your community, and stay away from those areas if possible. ? Cover as much of your body as possible when you work or play in grassy or wooded areas. ? Use insect repellents, such as products containing DEET. You can spray them on your skin. ? Use products that contain 0.5% permethrin on your clothing and outdoor gear, such as your tent. You can also buy clothing already treated with permethrin. ? Take steps to control ticks on your property if you live in an area where Lyme disease occurs. Clear leaves, brush, tall grasses, woodpiles, and stone fences from around your house and the edges of your yard or garden. This may help get rid of ticks.   · When you come in from outdoors, check your body for ticks, including your groin, head, and underarms. The ticks may be about the size of a poppy seed. If no one else can help you check for ticks on your scalp, comb your hair with a fine-tooth comb. · If you find a tick, remove it quickly. If you can't remove it with your fingers, use tweezers to grasp the tick as close to its mouth (the part in your skin) as possible. Slowly pull the tick straight out--do not twist or yank--until its mouth releases from your skin. If part of the tick stays in the skin, leave it alone. It will likely come out on its own in a few days. · Ticks can come into your house on clothing, outdoor gear, and pets. These ticks can fall off and attach to you. ? Check your clothing and outdoor gear. Remove any ticks you find. Then put your clothing in a clothes dryer on high heat for 1 hour to kill any ticks that might remain. ? Check your pets for ticks after they have been outdoors. When should you call for help? Call your doctor now or seek immediate medical care if:    · You are confused or cannot think clearly.     · You have a headache or stiff neck.     · You have a new or worse rash.     · You have symptoms of infection, such as:  ? Increased pain, swelling, warmth, or redness. ? Red streaks leading from the area. ? Pus draining from the area. ? A fever. Watch closely for changes in your health, and be sure to contact your doctor if:    · You have new or worse weakness or muscle pain.     · You have new joint pain.     · You do not get better as expected. Where can you learn more? Go to http://marika-huang.info/  Enter Y599 in the search box to learn more about \"Lyme Disease: Care Instructions. \"  Current as of: February 11, 2020               Content Version: 12.6  © 2652-0284 [employer]. Care instructions adapted under license by [employer] (which disclaims liability or warranty for this information).  If you have questions about a medical condition or this instruction, always ask your healthcare professional. [address] any warranty or liability for your use of this information. Patient Education        Joint Pain: Care Instructions  Your Care Instructions     Many people have small aches and pains from overuse or injury to muscles and joints. Joint injuries often happen during sports or recreation, work tasks, or projects around the home. An overuse injury can happen when you put too much stress on a joint or when you do an activity that stresses the joint over and over, such as using the computer or rowing a boat. You can take action at home to help your muscles and joints get better. You should feel better in 1 to 2 weeks, but it can take 3 months or more to heal completely. Follow-up care is a key part of your treatment and safety. Be sure to make and go to all appointments, and call your doctor if you are having problems. It's also a good idea to know your test results and keep a list of the medicines you take. How can you care for yourself at home? · Do not put weight on the injured joint for at least a day or two. · For the first day or two after an injury, do not take hot showers or baths, and do not use hot packs. The heat could make swelling worse. · Put ice or a cold pack on the sore joint for 10 to 20 minutes at a time. Try to do this every 1 to 2 hours for the next 3 days (when you are awake) or until the swelling goes down. Put a thin cloth between the ice and your skin. · Wrap the injury in an elastic bandage. Do not wrap it too tightly because this can cause more swelling. · Prop up the sore joint on a pillow when you ice it or anytime you sit or lie down during the next 3 days. Try to keep it above the level of your heart. This will help reduce swelling. · Take an over-the-counter pain medicine, such as acetaminophen (Tylenol), ibuprofen (Advil, Motrin), or naproxen (Aleve).  Read and follow all instructions on the label. · After 1 or 2 days of rest, begin moving the joint gently. While the joint is still healing, you can begin to exercise using activities that do not strain or hurt the painful joint. When should you call for help? Call your doctor now or seek immediate medical care if:    · You have signs of infection, such as:  ? Increased pain, swelling, warmth, and redness. ? Red streaks leading from the joint. ? A fever. Watch closely for changes in your health, and be sure to contact your doctor if:    · Your movement or symptoms are not getting better after 1 to 2 weeks of home treatment. Where can you learn more? Go to http://marika-huang.info/  Enter P205 in the search box to learn more about \"Joint Pain: Care Instructions. \"  Current as of: March 2, 2020               Content Version: 12.6  © 5846-8139 [employer]. Care instructions adapted under license by [employer] (which disclaims liability or warranty for this information). If you have questions about a medical condition or this instruction, always ask your healthcare professional. [address] any warranty or liability for your use of this information.

## 2024-02-20 ENCOUNTER — EMERGENCY (EMERGENCY)
Facility: HOSPITAL | Age: 64
LOS: 1 days | Discharge: HOME | End: 2024-02-20
Attending: STUDENT IN AN ORGANIZED HEALTH CARE EDUCATION/TRAINING PROGRAM | Admitting: STUDENT IN AN ORGANIZED HEALTH CARE EDUCATION/TRAINING PROGRAM
Payer: MEDICARE

## 2024-02-20 VITALS
DIASTOLIC BLOOD PRESSURE: 88 MMHG | TEMPERATURE: 99 F | SYSTOLIC BLOOD PRESSURE: 152 MMHG | HEART RATE: 72 BPM | RESPIRATION RATE: 19 BRPM | OXYGEN SATURATION: 99 % | WEIGHT: 160.06 LBS | HEIGHT: 72 IN

## 2024-02-20 DIAGNOSIS — N20.0 CALCULUS OF KIDNEY: ICD-10-CM

## 2024-02-20 DIAGNOSIS — R10.9 UNSPECIFIED ABDOMINAL PAIN: ICD-10-CM

## 2024-02-20 DIAGNOSIS — Z88.1 ALLERGY STATUS TO OTHER ANTIBIOTIC AGENTS STATUS: ICD-10-CM

## 2024-02-20 DIAGNOSIS — R35.0 FREQUENCY OF MICTURITION: ICD-10-CM

## 2024-02-20 DIAGNOSIS — N40.1 BENIGN PROSTATIC HYPERPLASIA WITH LOWER URINARY TRACT SYMPTOMS: ICD-10-CM

## 2024-02-20 DIAGNOSIS — Z87.442 PERSONAL HISTORY OF URINARY CALCULI: ICD-10-CM

## 2024-02-20 LAB
ANION GAP SERPL CALC-SCNC: 8 MMOL/L — SIGNIFICANT CHANGE UP (ref 5–17)
APPEARANCE UR: CLEAR — SIGNIFICANT CHANGE UP
APTT BLD: 34.3 SEC — SIGNIFICANT CHANGE UP (ref 24.5–35.6)
BACTERIA # UR AUTO: NEGATIVE /HPF — SIGNIFICANT CHANGE UP
BASOPHILS # BLD AUTO: 0.03 K/UL — SIGNIFICANT CHANGE UP (ref 0–0.2)
BASOPHILS NFR BLD AUTO: 0.5 % — SIGNIFICANT CHANGE UP (ref 0–2)
BILIRUB UR-MCNC: NEGATIVE — SIGNIFICANT CHANGE UP
BLD GP AB SCN SERPL QL: NEGATIVE — SIGNIFICANT CHANGE UP
BUN SERPL-MCNC: 23 MG/DL — SIGNIFICANT CHANGE UP (ref 7–23)
CALCIUM SERPL-MCNC: 9.4 MG/DL — SIGNIFICANT CHANGE UP (ref 8.4–10.5)
CAST: 0 /LPF — SIGNIFICANT CHANGE UP (ref 0–4)
CHLORIDE SERPL-SCNC: 100 MMOL/L — SIGNIFICANT CHANGE UP (ref 96–108)
CO2 SERPL-SCNC: 26 MMOL/L — SIGNIFICANT CHANGE UP (ref 22–31)
COLOR SPEC: YELLOW — SIGNIFICANT CHANGE UP
CREAT SERPL-MCNC: 0.95 MG/DL — SIGNIFICANT CHANGE UP (ref 0.5–1.3)
DIFF PNL FLD: ABNORMAL
EGFR: 89 ML/MIN/1.73M2 — SIGNIFICANT CHANGE UP
EOSINOPHIL # BLD AUTO: 0.13 K/UL — SIGNIFICANT CHANGE UP (ref 0–0.5)
EOSINOPHIL NFR BLD AUTO: 2.2 % — SIGNIFICANT CHANGE UP (ref 0–6)
GLUCOSE SERPL-MCNC: 87 MG/DL — SIGNIFICANT CHANGE UP (ref 70–99)
GLUCOSE UR QL: NEGATIVE MG/DL — SIGNIFICANT CHANGE UP
HCT VFR BLD CALC: 43.5 % — SIGNIFICANT CHANGE UP (ref 39–50)
HGB BLD-MCNC: 14.9 G/DL — SIGNIFICANT CHANGE UP (ref 13–17)
IMM GRANULOCYTES NFR BLD AUTO: 0.2 % — SIGNIFICANT CHANGE UP (ref 0–0.9)
INR BLD: 0.95 — SIGNIFICANT CHANGE UP (ref 0.85–1.18)
KETONES UR-MCNC: NEGATIVE MG/DL — SIGNIFICANT CHANGE UP
LEUKOCYTE ESTERASE UR-ACNC: ABNORMAL
LYMPHOCYTES # BLD AUTO: 1.17 K/UL — SIGNIFICANT CHANGE UP (ref 1–3.3)
LYMPHOCYTES # BLD AUTO: 20.1 % — SIGNIFICANT CHANGE UP (ref 13–44)
MCHC RBC-ENTMCNC: 31.7 PG — SIGNIFICANT CHANGE UP (ref 27–34)
MCHC RBC-ENTMCNC: 34.3 GM/DL — SIGNIFICANT CHANGE UP (ref 32–36)
MCV RBC AUTO: 92.6 FL — SIGNIFICANT CHANGE UP (ref 80–100)
MONOCYTES # BLD AUTO: 0.35 K/UL — SIGNIFICANT CHANGE UP (ref 0–0.9)
MONOCYTES NFR BLD AUTO: 6 % — SIGNIFICANT CHANGE UP (ref 2–14)
NEUTROPHILS # BLD AUTO: 4.12 K/UL — SIGNIFICANT CHANGE UP (ref 1.8–7.4)
NEUTROPHILS NFR BLD AUTO: 71 % — SIGNIFICANT CHANGE UP (ref 43–77)
NITRITE UR-MCNC: NEGATIVE — SIGNIFICANT CHANGE UP
NRBC # BLD: 0 /100 WBCS — SIGNIFICANT CHANGE UP (ref 0–0)
PH UR: 6.5 — SIGNIFICANT CHANGE UP (ref 5–8)
PLATELET # BLD AUTO: 206 K/UL — SIGNIFICANT CHANGE UP (ref 150–400)
POTASSIUM SERPL-MCNC: 4.1 MMOL/L — SIGNIFICANT CHANGE UP (ref 3.5–5.3)
POTASSIUM SERPL-SCNC: 4.1 MMOL/L — SIGNIFICANT CHANGE UP (ref 3.5–5.3)
PROT UR-MCNC: SIGNIFICANT CHANGE UP MG/DL
PROTHROM AB SERPL-ACNC: 10.8 SEC — SIGNIFICANT CHANGE UP (ref 9.5–13)
RBC # BLD: 4.7 M/UL — SIGNIFICANT CHANGE UP (ref 4.2–5.8)
RBC # FLD: 11.9 % — SIGNIFICANT CHANGE UP (ref 10.3–14.5)
RBC CASTS # UR COMP ASSIST: 2 /HPF — SIGNIFICANT CHANGE UP (ref 0–4)
RH IG SCN BLD-IMP: POSITIVE — SIGNIFICANT CHANGE UP
SODIUM SERPL-SCNC: 134 MMOL/L — LOW (ref 135–145)
SP GR SPEC: 1.01 — SIGNIFICANT CHANGE UP (ref 1–1.03)
SQUAMOUS # UR AUTO: 0 /HPF — SIGNIFICANT CHANGE UP (ref 0–5)
UROBILINOGEN FLD QL: 0.2 MG/DL — SIGNIFICANT CHANGE UP (ref 0.2–1)
WBC # BLD: 5.81 K/UL — SIGNIFICANT CHANGE UP (ref 3.8–10.5)
WBC # FLD AUTO: 5.81 K/UL — SIGNIFICANT CHANGE UP (ref 3.8–10.5)
WBC UR QL: 2 /HPF — SIGNIFICANT CHANGE UP (ref 0–5)

## 2024-02-20 PROCEDURE — 99285 EMERGENCY DEPT VISIT HI MDM: CPT

## 2024-02-20 PROCEDURE — 74176 CT ABD & PELVIS W/O CONTRAST: CPT | Mod: 26,MA

## 2024-02-20 NOTE — ED ADULT NURSE NOTE - NSFALLUNIVINTERV_ED_ALL_ED
Bed/Stretcher in lowest position, wheels locked, appropriate side rails in place/Call bell, personal items and telephone in reach/Instruct patient to call for assistance before getting out of bed/chair/stretcher/Non-slip footwear applied when patient is off stretcher/[place] to call system/Physically safe environment - no spills, clutter or unnecessary equipment/Purposeful proactive rounding/Room/bathroom lighting operational, light cord in reach

## 2024-02-20 NOTE — ED ADULT TRIAGE NOTE - OTHER COMPLAINTS
pt states that he has been having urinary frequency with dysuria x yesterday. Endorsing abd pain, no nausea/vomiting.

## 2024-02-20 NOTE — ED ADULT NURSE NOTE - OBJECTIVE STATEMENT
Pt presents to ED c/o urinary symptoms. Reports urinary frequency with dysuria x yesterday. Endorsing lower mid abdominal pain. Denies nausea/vomiting/diarrhea, fever, CP, SOB. PMH kidney stones.

## 2024-02-21 VITALS
OXYGEN SATURATION: 98 % | HEART RATE: 60 BPM | RESPIRATION RATE: 17 BRPM | DIASTOLIC BLOOD PRESSURE: 81 MMHG | TEMPERATURE: 97 F | SYSTOLIC BLOOD PRESSURE: 125 MMHG

## 2024-02-21 PROBLEM — Z00.00 ENCOUNTER FOR PREVENTIVE HEALTH EXAMINATION: Status: ACTIVE | Noted: 2024-02-21

## 2024-02-21 PROCEDURE — 96376 TX/PRO/DX INJ SAME DRUG ADON: CPT

## 2024-02-21 PROCEDURE — 99284 EMERGENCY DEPT VISIT MOD MDM: CPT | Mod: 25

## 2024-02-21 PROCEDURE — 86850 RBC ANTIBODY SCREEN: CPT

## 2024-02-21 PROCEDURE — 96374 THER/PROPH/DIAG INJ IV PUSH: CPT

## 2024-02-21 PROCEDURE — 36415 COLL VENOUS BLD VENIPUNCTURE: CPT

## 2024-02-21 PROCEDURE — 87086 URINE CULTURE/COLONY COUNT: CPT

## 2024-02-21 PROCEDURE — 80048 BASIC METABOLIC PNL TOTAL CA: CPT

## 2024-02-21 PROCEDURE — 86900 BLOOD TYPING SEROLOGIC ABO: CPT

## 2024-02-21 PROCEDURE — 85730 THROMBOPLASTIN TIME PARTIAL: CPT

## 2024-02-21 PROCEDURE — 85025 COMPLETE CBC W/AUTO DIFF WBC: CPT

## 2024-02-21 PROCEDURE — 81001 URINALYSIS AUTO W/SCOPE: CPT

## 2024-02-21 PROCEDURE — 85610 PROTHROMBIN TIME: CPT

## 2024-02-21 PROCEDURE — 74176 CT ABD & PELVIS W/O CONTRAST: CPT | Mod: MA

## 2024-02-21 PROCEDURE — 86901 BLOOD TYPING SEROLOGIC RH(D): CPT

## 2024-02-21 RX ORDER — SODIUM CHLORIDE 9 MG/ML
2000 INJECTION, SOLUTION INTRAVENOUS ONCE
Refills: 0 | Status: COMPLETED | OUTPATIENT
Start: 2024-02-21 | End: 2024-02-21

## 2024-02-21 RX ORDER — PHENAZOPYRIDINE HCL 100 MG
1 TABLET ORAL
Qty: 6 | Refills: 0
Start: 2024-02-21 | End: 2024-02-22

## 2024-02-21 RX ORDER — PHENAZOPYRIDINE HCL 100 MG
200 TABLET ORAL ONCE
Refills: 0 | Status: COMPLETED | OUTPATIENT
Start: 2024-02-21 | End: 2024-02-21

## 2024-02-21 RX ORDER — IBUPROFEN 200 MG
1 TABLET ORAL
Qty: 50 | Refills: 0
Start: 2024-02-21

## 2024-02-21 RX ORDER — KETOROLAC TROMETHAMINE 30 MG/ML
15 SYRINGE (ML) INJECTION ONCE
Refills: 0 | Status: DISCONTINUED | OUTPATIENT
Start: 2024-02-21 | End: 2024-02-21

## 2024-02-21 RX ORDER — TAMSULOSIN HYDROCHLORIDE 0.4 MG/1
1 CAPSULE ORAL
Qty: 10 | Refills: 0
Start: 2024-02-21 | End: 2024-03-01

## 2024-02-21 RX ADMIN — SODIUM CHLORIDE 2000 MILLILITER(S): 9 INJECTION, SOLUTION INTRAVENOUS at 00:24

## 2024-02-21 RX ADMIN — Medication 200 MILLIGRAM(S): at 00:20

## 2024-02-21 RX ADMIN — Medication 15 MILLIGRAM(S): at 00:21

## 2024-02-21 RX ADMIN — Medication 15 MILLIGRAM(S): at 01:35

## 2024-02-21 NOTE — ED PROVIDER NOTE - CARE PROVIDER_API CALL
Sabino Pike  Urology  [address] [place] [id number]  Phone: [phone]  Fax: [phone]  Follow Up Time:

## 2024-02-21 NOTE — ED PROVIDER NOTE - PHYSICAL EXAMINATION
CONST: nontoxic NAD speaking in full sentences  HEAD: atraumatic  EYES: conjunctivae clear  NECK: supple  CARD: regular rate  CHEST: breathing comfortably, no stridor/retractions/tripoding  ABD: soft nontender nondistended, +mild L CVAT  EXT: FROM  SKIN: warm, dry  NEURO: awake alert answering questions following commands moving all extremities

## 2024-02-21 NOTE — ED PROVIDER NOTE - CLINICAL SUMMARY MEDICAL DECISION MAKING FREE TEXT BOX
Normal VS  c/f kidney stone  will check labs and UA  fluids, pain meds, urology 2 c (pt was sent in by Dr Pike)    -->CT read noted, labs w/o leukocytosis, normal Cr, negative UA. Discussed w/ urology, all results non-actionable, pt pain controlled, voiding freely, stable for dc w/ outpt f/u with Dr Pike. Per urology- stop bactrim, dc w/ pyridium and flomax

## 2024-02-21 NOTE — ED PROVIDER NOTE - OBJECTIVE STATEMENT
63yM w/ BPH, prior kidney stones requiring lithotripsy and stents in the past, comes in for 1 day severe L flank pain associated w/ frequency. No fever/chills/hematuria. No vomiting/diarrhea. Was started on bactrim and sent in by his urologist.

## 2024-02-21 NOTE — ED PROVIDER NOTE - PATIENT PORTAL LINK FT
You can access the FollowMyHealth Patient Portal offered by [facility] by registering at the following website: [email or web address]. By joining [employer]’s FollowMyHealth portal, you will also be able to view your health information using other applications (apps) compatible with our system.

## 2024-02-21 NOTE — ED PROVIDER NOTE - NSFOLLOWUPINSTRUCTIONS_ED_ALL_ED_FT
TAKE FLOMAX AT NIGHT, MAKE SURE TO STAY HYDRATED WITH LOTS OF FLUID. USE THE STRAINER WHEN YOU URINATE SO YOU CAN CATCH THE KIDNEY STONE WHEN YOU PASS IT. YOU SHOULD TAKE PYRIDIUM FOR URINARY PAIN AND BURNING. STOP THE BACTRIM ANTIBIOTICS AND FOLLOW UP WITH DR BLACKWOOD NEXT WEEK.    Flank Pain    WHAT YOU NEED TO KNOW:    Flank pain is felt in the area below your ribcage and above your hip bones, often in the lower back. Your pain may be dull or so severe that you cannot get comfortable. The pain may stay in one area or radiate to another area. It may worsen and lighten in waves. Flank pain is often a sign of problems with your urinary tract, such as a kidney stone or infection.    DISCHARGE INSTRUCTIONS:    Return to the emergency department if:    You have a fever.    Your heart is fluttering or jumping.    You see blood in your urine.    Your pain radiates into your lower abdomen and genital area.    You have intense pain in your low back next to your spine.    You are much more tired than usual and have no desire to eat.    You have a headache and your muscles jerk.  Contact your healthcare provider if:    You have an upset stomach and are vomiting.    You have to urinate more often, and with urgency.    Your pain worsens or does not improve, and you cannot get comfortable.    You pass a stone when you urinate.    You have questions or concerns about your condition or care.  Medicines: The following medicines may be ordered for you:    Pain medicine may help decrease or relieve your pain. Do not wait until the pain is severe before you take your medicine.    Antibiotics may help treat a urinary tract infection caused by bacteria.    Take your medicine as directed. Contact your healthcare provider if you think your medicine is not helping or if you have side effects. Tell your provider if you are allergic to any medicine. Keep a list of the medicines, vitamins, and herbs you take. Include the amounts, and when and why you take them. Bring the list or the pill bottles to follow-up visits. Carry your medicine list with you in case of an emergency.  Follow up with your healthcare provider in 1 to 2 days or as directed: Write down your questions so you remember to ask them during your visits.    © [employer] 1973, 2024    	  back to top            © [employer] 1973, 2024

## 2024-02-21 NOTE — CONSULT NOTE ADULT - SUBJECTIVE AND OBJECTIVE BOX
Patient is a 63y old  Male who presents with a chief complaint of left lower quadrant pain.    HPI:    Patient is a 63M w/ PMH of BPH, kidney stones requiring lithotripsy, ESWL, and ureteral stents in the past.  Patient today reports to ED w/ 1 day sudden onset left lower quadrant pain w/ radiation to left flank.  Patient states this pain feels different from his past experiences with nephrolithiasis.  Patient is endorsing dysuria, frequency.  Prior to coming to ED he took Ibuprofen 200mg, w/ minimal improvement to pain; from 10/10 to 7/10. Patient denies n/v/f/c, hematuria, chest pain, SOB.     Vital Signs Last 24 Hrs  T(C): 36.3 (21 Feb 2024 02:02), Max: 37.1 (20 Feb 2024 21:14)  T(F): 97.4 (21 Feb 2024 02:02), Max: 98.7 (20 Feb 2024 21:14)  HR: 60 (21 Feb 2024 02:02) (60 - 72)  BP: 125/81 (21 Feb 2024 02:02) (125/81 - 152/88)  BP(mean): --  RR: 17 (21 Feb 2024 02:02) (17 - 19)  SpO2: 98% (21 Feb 2024 02:02) (98% - 99%)    Parameters below as of 21 Feb 2024 02:02  Patient On (Oxygen Delivery Method): room air      I&O's Summary      PE:  Gen: NAD, alert and oriented x 3   Abd: soft nt/nd. Mild Left CVAT.  : Negative SP tenderness.   GETACHEW: Deferred.     LABS:                        14.9   5.81  )-----------( 206      ( 20 Feb 2024 22:11 )             43.5     02-20    134<L>  |  100  |  23  ----------------------------<  87  4.1   |  26  |  0.95    Ca    9.4      20 Feb 2024 22:11      PT/INR - ( 20 Feb 2024 22:11 )   PT: 10.8 sec;   INR: 0.95          PTT - ( 20 Feb 2024 22:11 )  PTT:34.3 sec  Cultures      A/P

## 2024-02-22 ENCOUNTER — APPOINTMENT (OUTPATIENT)
Dept: UROLOGY | Facility: CLINIC | Age: 64
End: 2024-02-22
Payer: MEDICARE

## 2024-02-22 DIAGNOSIS — N20.0 CALCULUS OF KIDNEY: ICD-10-CM

## 2024-02-22 DIAGNOSIS — N40.1 BENIGN PROSTATIC HYPERPLASIA WITH LOWER URINARY TRACT SYMPMS: ICD-10-CM

## 2024-02-22 DIAGNOSIS — R39.15 BENIGN PROSTATIC HYPERPLASIA WITH LOWER URINARY TRACT SYMPMS: ICD-10-CM

## 2024-02-22 LAB
CULTURE RESULTS: NO GROWTH — SIGNIFICANT CHANGE UP
SPECIMEN SOURCE: SIGNIFICANT CHANGE UP

## 2024-02-22 PROCEDURE — 52000 CYSTOURETHROSCOPY: CPT

## 2024-02-22 PROCEDURE — 99204 OFFICE O/P NEW MOD 45 MIN: CPT | Mod: 25

## 2024-02-22 RX ORDER — TAMSULOSIN HYDROCHLORIDE 0.4 MG/1
0.4 CAPSULE ORAL
Qty: 30 | Refills: 5 | Status: ACTIVE | COMMUNITY
Start: 2024-02-22 | End: 1900-01-01

## 2024-02-22 RX ORDER — KETOROLAC TROMETHAMINE 10 MG/1
10 TABLET, FILM COATED ORAL EVERY 6 HOURS
Qty: 20 | Refills: 0 | Status: ACTIVE | COMMUNITY
Start: 2024-02-22 | End: 1900-01-01

## 2024-02-22 RX ORDER — OXYBUTYNIN CHLORIDE 10 MG/1
10 TABLET, EXTENDED RELEASE ORAL DAILY
Qty: 30 | Refills: 5 | Status: ACTIVE | COMMUNITY
Start: 2024-02-22 | End: 1900-01-01

## 2024-02-22 NOTE — HISTORY OF PRESENT ILLNESS
[FreeTextEntry1] : Name GLORY PERDUE MRN [id number]  Oct 21, 1960 ------------------------------------------------------------------------------------------------------------------------------------------- Date of First Visit: 24 Referring Provider: Sabino Pike MD ------------------------------------------------------------------------------------------------------------------------------------------- CC: Urinary urgency and frequency, kidney stones   History of Present Illness: GLORY PERDUE is a 63-year-old male with no significant PMH referred for evaluation of kidney stones, bladder stone, and evaluation of bothersome LUTS.  States his LUTS have been ongoing for "quite some time now. " Symptoms significantly worsened over the last 2 days, including severe urinary urgency, frequency, and suprapubic discomfort.  Patient appears visibly uncomfortable and cannot sit still, with frequent running to the bathroom.  Started taking Bactrim but did not complete it.  Not taking any other medications at the moment. Recent CT scan  in the [facility] ED demonstrated: IMPRESSION: 1.  Moderate left hydroureteronephrosis. Punctate calculus at the left ureterovesical junction and intravesical calculi measuring up to 9 mm, which may represent recently passed stone. Additional bilateral non-obstructing nephrolithiasis, measuring up to 1 cm on the left. 2.  Focal anterior bladder wall thickening. Correlate for cystitis.

## 2024-02-22 NOTE — ASSESSMENT
[FreeTextEntry1] : Assessment:  Mr. GLORY PERDUE is a 63 year old male with history of kidney stones and currently with extremely bothersome irritative LUTS with 1, possible to stones in the bladder.  Recent CT scan 2 days ago was notable for moderate hydronephrosis on the right suspicious for a recently passed stone (may represent one of the stones in the bladder) versus a small persistent stone at the ureterovesical junction.  Patient did not tolerate cystoscopy under nitrous oxide analgesia.  I independently reviewed the patient's relevant imaging studies and discussed the findings and my impressions with them.   Plan: -Flomax, oxybutynin, Pyridium -mechanisms of action and risks of side effects discussed for each of these medications -Avoid bladder irritants (caffeine, alcohol, smoking/tobacco products, spicy and acidic foods) -Will follow-up with patient by phone tomorrow morning to see if his symptoms have improved at all.  He knows we may consider a repeat CT scan in the prone positioning to rule out persistent ureterovesical junction stone -May also reconsider repeating cystoscopy as patient is more amenable to it now to remove bladder stones

## 2024-02-26 ENCOUNTER — NON-APPOINTMENT (OUTPATIENT)
Age: 64
End: 2024-02-26

## 2024-07-09 ENCOUNTER — TRANSCRIPTION ENCOUNTER (OUTPATIENT)
Age: 64
End: 2024-07-09

## 2024-07-09 ENCOUNTER — EMERGENCY (EMERGENCY)
Facility: HOSPITAL | Age: 64
LOS: 1 days | End: 2024-07-09
Attending: EMERGENCY MEDICINE | Admitting: INTERNAL MEDICINE
Payer: MEDICARE

## 2024-07-09 VITALS
SYSTOLIC BLOOD PRESSURE: 120 MMHG | OXYGEN SATURATION: 100 % | HEART RATE: 157 BPM | DIASTOLIC BLOOD PRESSURE: 83 MMHG | TEMPERATURE: 98 F | RESPIRATION RATE: 20 BRPM

## 2024-07-09 VITALS
SYSTOLIC BLOOD PRESSURE: 111 MMHG | DIASTOLIC BLOOD PRESSURE: 74 MMHG | TEMPERATURE: 97 F | HEART RATE: 63 BPM | OXYGEN SATURATION: 98 % | RESPIRATION RATE: 18 BRPM

## 2024-07-09 DIAGNOSIS — I48.91 UNSPECIFIED ATRIAL FIBRILLATION: ICD-10-CM

## 2024-07-09 DIAGNOSIS — Z98.890 OTHER SPECIFIED POSTPROCEDURAL STATES: Chronic | ICD-10-CM

## 2024-07-09 LAB
ALBUMIN SERPL ELPH-MCNC: 4.2 G/DL — SIGNIFICANT CHANGE UP (ref 3.3–5)
ALP SERPL-CCNC: 68 U/L — SIGNIFICANT CHANGE UP (ref 40–120)
ALT FLD-CCNC: 14 U/L — SIGNIFICANT CHANGE UP (ref 10–45)
ANION GAP SERPL CALC-SCNC: 8 MMOL/L — SIGNIFICANT CHANGE UP (ref 5–17)
APTT BLD: 35.2 SEC — SIGNIFICANT CHANGE UP (ref 24.5–35.6)
AST SERPL-CCNC: 14 U/L — SIGNIFICANT CHANGE UP (ref 10–40)
BASOPHILS # BLD AUTO: 0.03 K/UL — SIGNIFICANT CHANGE UP (ref 0–0.2)
BASOPHILS NFR BLD AUTO: 0.6 % — SIGNIFICANT CHANGE UP (ref 0–2)
BILIRUB SERPL-MCNC: 0.4 MG/DL — SIGNIFICANT CHANGE UP (ref 0.2–1.2)
BUN SERPL-MCNC: 24 MG/DL — HIGH (ref 7–23)
CALCIUM SERPL-MCNC: 9.8 MG/DL — SIGNIFICANT CHANGE UP (ref 8.4–10.5)
CHLORIDE SERPL-SCNC: 103 MMOL/L — SIGNIFICANT CHANGE UP (ref 96–108)
CO2 SERPL-SCNC: 27 MMOL/L — SIGNIFICANT CHANGE UP (ref 22–31)
CREAT SERPL-MCNC: 1.13 MG/DL — SIGNIFICANT CHANGE UP (ref 0.5–1.3)
EGFR: 72 ML/MIN/1.73M2 — SIGNIFICANT CHANGE UP
EOSINOPHIL # BLD AUTO: 0.16 K/UL — SIGNIFICANT CHANGE UP (ref 0–0.5)
EOSINOPHIL NFR BLD AUTO: 3.1 % — SIGNIFICANT CHANGE UP (ref 0–6)
GLUCOSE SERPL-MCNC: 100 MG/DL — HIGH (ref 70–99)
HCT VFR BLD CALC: 46.1 % — SIGNIFICANT CHANGE UP (ref 39–50)
HGB BLD-MCNC: 15.3 G/DL — SIGNIFICANT CHANGE UP (ref 13–17)
IMM GRANULOCYTES NFR BLD AUTO: 0.4 % — SIGNIFICANT CHANGE UP (ref 0–0.9)
INR BLD: 0.94 — SIGNIFICANT CHANGE UP (ref 0.85–1.18)
LYMPHOCYTES # BLD AUTO: 1.42 K/UL — SIGNIFICANT CHANGE UP (ref 1–3.3)
LYMPHOCYTES # BLD AUTO: 27.4 % — SIGNIFICANT CHANGE UP (ref 13–44)
MAGNESIUM SERPL-MCNC: 2 MG/DL — SIGNIFICANT CHANGE UP (ref 1.6–2.6)
MCHC RBC-ENTMCNC: 31.4 PG — SIGNIFICANT CHANGE UP (ref 27–34)
MCHC RBC-ENTMCNC: 33.2 GM/DL — SIGNIFICANT CHANGE UP (ref 32–36)
MCV RBC AUTO: 94.7 FL — SIGNIFICANT CHANGE UP (ref 80–100)
MONOCYTES # BLD AUTO: 0.48 K/UL — SIGNIFICANT CHANGE UP (ref 0–0.9)
MONOCYTES NFR BLD AUTO: 9.2 % — SIGNIFICANT CHANGE UP (ref 2–14)
NEUTROPHILS # BLD AUTO: 3.08 K/UL — SIGNIFICANT CHANGE UP (ref 1.8–7.4)
NEUTROPHILS NFR BLD AUTO: 59.3 % — SIGNIFICANT CHANGE UP (ref 43–77)
NRBC # BLD: 0 /100 WBCS — SIGNIFICANT CHANGE UP (ref 0–0)
PLATELET # BLD AUTO: 198 K/UL — SIGNIFICANT CHANGE UP (ref 150–400)
POTASSIUM SERPL-MCNC: 4.2 MMOL/L — SIGNIFICANT CHANGE UP (ref 3.5–5.3)
POTASSIUM SERPL-SCNC: 4.2 MMOL/L — SIGNIFICANT CHANGE UP (ref 3.5–5.3)
PROT SERPL-MCNC: 7 G/DL — SIGNIFICANT CHANGE UP (ref 6–8.3)
PROTHROM AB SERPL-ACNC: 10.7 SEC — SIGNIFICANT CHANGE UP (ref 9.5–13)
RBC # BLD: 4.87 M/UL — SIGNIFICANT CHANGE UP (ref 4.2–5.8)
RBC # FLD: 11.9 % — SIGNIFICANT CHANGE UP (ref 10.3–14.5)
SODIUM SERPL-SCNC: 138 MMOL/L — SIGNIFICANT CHANGE UP (ref 135–145)
TROPONIN T, HIGH SENSITIVITY RESULT: 9 NG/L — SIGNIFICANT CHANGE UP (ref 0–51)
WBC # BLD: 5.19 K/UL — SIGNIFICANT CHANGE UP (ref 3.8–10.5)
WBC # FLD AUTO: 5.19 K/UL — SIGNIFICANT CHANGE UP (ref 3.8–10.5)

## 2024-07-09 PROCEDURE — 80053 COMPREHEN METABOLIC PANEL: CPT

## 2024-07-09 PROCEDURE — 93010 ELECTROCARDIOGRAM REPORT: CPT

## 2024-07-09 PROCEDURE — 99285 EMERGENCY DEPT VISIT HI MDM: CPT | Mod: 25

## 2024-07-09 PROCEDURE — 83735 ASSAY OF MAGNESIUM: CPT

## 2024-07-09 PROCEDURE — 85730 THROMBOPLASTIN TIME PARTIAL: CPT

## 2024-07-09 PROCEDURE — 99291 CRITICAL CARE FIRST HOUR: CPT

## 2024-07-09 PROCEDURE — 85025 COMPLETE CBC W/AUTO DIFF WBC: CPT

## 2024-07-09 PROCEDURE — 99222 1ST HOSP IP/OBS MODERATE 55: CPT

## 2024-07-09 PROCEDURE — 84484 ASSAY OF TROPONIN QUANT: CPT

## 2024-07-09 PROCEDURE — 71045 X-RAY EXAM CHEST 1 VIEW: CPT

## 2024-07-09 PROCEDURE — 36415 COLL VENOUS BLD VENIPUNCTURE: CPT

## 2024-07-09 PROCEDURE — 85610 PROTHROMBIN TIME: CPT

## 2024-07-09 PROCEDURE — 96374 THER/PROPH/DIAG INJ IV PUSH: CPT

## 2024-07-09 PROCEDURE — 93005 ELECTROCARDIOGRAM TRACING: CPT

## 2024-07-09 PROCEDURE — 71045 X-RAY EXAM CHEST 1 VIEW: CPT | Mod: 26

## 2024-07-09 RX ORDER — TRAZODONE HYDROCHLORIDE 50 MG/1
1 TABLET, FILM COATED ORAL
Refills: 0 | DISCHARGE

## 2024-07-09 RX ORDER — METOPROLOL TARTRATE 50 MG
25 TABLET ORAL ONCE
Refills: 0 | Status: COMPLETED | OUTPATIENT
Start: 2024-07-09 | End: 2024-07-09

## 2024-07-09 RX ORDER — METOPROLOL TARTRATE 50 MG
1 TABLET ORAL
Qty: 60 | Refills: 0
Start: 2024-07-09 | End: 2024-08-07

## 2024-07-09 RX ORDER — METOPROLOL TARTRATE 50 MG
5 TABLET ORAL ONCE
Refills: 0 | Status: COMPLETED | OUTPATIENT
Start: 2024-07-09 | End: 2024-07-09

## 2024-07-09 RX ORDER — APIXABAN 5 MG/1
1 TABLET, FILM COATED ORAL
Qty: 60 | Refills: 0
Start: 2024-07-09 | End: 2024-08-07

## 2024-07-09 RX ORDER — APIXABAN 5 MG/1
5 TABLET, FILM COATED ORAL ONCE
Refills: 0 | Status: COMPLETED | OUTPATIENT
Start: 2024-07-09 | End: 2024-07-09

## 2024-07-09 RX ADMIN — Medication 25 MILLIGRAM(S): at 11:41

## 2024-07-09 RX ADMIN — Medication 5 MILLIGRAM(S): at 11:14

## 2024-07-09 RX ADMIN — APIXABAN 5 MILLIGRAM(S): 5 TABLET, FILM COATED ORAL at 11:48

## 2024-07-09 NOTE — DISCHARGE NOTE PROVIDER - NSDCMRMEDTOKEN_GEN_ALL_CORE_FT
Eliquis 5 mg oral tablet: 1 tab(s) orally 2 times a day  Metoprolol Tartrate 25 mg oral tablet: 1 tab(s) orally 2 times a day  traZODone 50 mg oral tablet: 1 tab(s) orally once a day (at bedtime) as needed for  insomnia

## 2024-07-09 NOTE — ED PROVIDER NOTE - CLINICAL SUMMARY MEDICAL DECISION MAKING FREE TEXT BOX
Impression: 63-year-old male, history of hypertension controlled with diet and exercise, anxiety, kidney stones, recently started on trazodone a few weeks ago for insomnia, who presents with complaints of intermittent dizziness, palpitations, shortness of breath starting last night.  Symptoms recurred again while walking to work this morning and patient presented to an urgent care center where he was diagnosed with rapid A-fib.  Patient  endorses a slight heaviness to the middle of his chest however no pain.  Patient was in his usual state of health otherwise with no fevers, chills, URI symptoms, nausea, vomiting, diarrhea, abdominal pain, urinary symptoms, headache, numbness, tingling, weakness, speech or gait difficulty… Afebrile. HDS. EKG showing afib w/ rvr, no stemi. Trop 9. Electrolytes wnll. Pt given lopressor 5mg iv w/ improvement. Will give metoprolol po. Pt had brief (4 beats of NSVT). Case d/w EP; will admit for further work up and management. Will give eliquis for CHADS score of 1.

## 2024-07-09 NOTE — CONSULT NOTE ADULT - ASSESSMENT
63M, PMHx  HTN controlled with diet and exercise, anxiety, kidney stones, who presents with complaints of lightheadedness, palpitations, shortness of breath starting last night.   Patients  ECG show atrial fibrillation with RVR, his HR improved after Lopressor IV. Plan was for IVNAA/DCCV , but patient self converted to SR.   He was started on Eliquis and BB, he should follow up with EPS for further management and possible ablation .

## 2024-07-09 NOTE — H&P ADULT - HISTORY OF PRESENT ILLNESS
63M, PMHx  HTN controlled with diet and exercise, anxiety, kidney stones, who presents with complaints of lightheadedness, palpitations, shortness of breath starting last night. Pt woke up with a recurrence of symptoms and felt unable to walk to work, so presented to Urgent Care where new rapid atrial fibrillation was diagnosed and pt was sent to [facility] ER.  Pt  endorses a slight constant heaviness to the middle of his chest; pt denies radiation of sensation.  Of note, pt has a history of anxiety and thought to have a history of panic attacks, however he does endorse similarity of panic attack symptoms to today's AFib symptoms, though today was the worst they have been. Pt previously took Klonopin PRN but now has switched two weeks ago to Trazodone PRN.  Pt denies SOB at rest,  orthopnea/PND, cough, leg swelling, LOC, fall, syncope, N/V/D, fever/chills/sick contact, BRBPR, melena/hematochezia.  Pt does endorse due to history of kidney stones that he is always told he has microscopic hematuria on urinalysis; denies brigitte hematuria.     In the ER, EKG revealed 157bpm rapid atrial fibrillation. Telemetry seen by ER MD revealed ectopy of 4 beats NSVT.  HR was controlled to 90s-110s bpm by 5mg IVP Lopressor and 25mg PO Lopressor x1.  Other VSS.  Labs revealed normal CBC and Coags,  BUN/Cr 24/1.13, TropT 9.      Pt now admitted to cardiology for further management of new onset atrial fibrillation with RVR.

## 2024-07-09 NOTE — H&P ADULT - SOCIAL HISTORY: TOBACCO USE
endorses current use at 4 cigarettes per day, in process of quitting; declines nicotine cessation therapy

## 2024-07-09 NOTE — DISCHARGE NOTE PROVIDER - HOSPITAL COURSE
63M, PMHx  HTN controlled with diet and exercise, anxiety, kidney stones, who presents with complaints of lightheadedness, palpitations, shortness of breath starting last night, who is now admitted to cardiology for further management of new onset atrial fibrillation with RVR.       Atrial fibrillation.   -In AFib w. HR 90-110s during time of interview, then converted NSR w/ HR 60s bpm.   -s/p 5mg IVP and 25mg PO Lopressor, and Eliquis 5mg in ER   -Echo ordered, however now cleared for f/up outpt  -Chadsvasc 2: c/w Eliquis 5mg BID  -Will initiate 25mg Lopressor BID on discharge.  -EP consulted, had planned for IVANA/DCCV, however converted to NSR, outpt followup made for 7/11/24 with Dr. Padilla.    Patient seen and examined at bedside today, admitted for atrial fibrillation which then converted to Normal Sinus Rhythm and pt cleared for discharge home. VSS, labs and telemetry reviewed; pt is stable for discharge as discussed with attending Dr. Lynn. Pt has received appropriate discharge instructions, including medication regimen and to follow up with Dr. Padilla as scheduled on 7/11/24 at 3:30pm. All discharge medications were confirmed with attending. All new medications or medications needing refills were e-prescribed to pt’s preferred pharmacy.

## 2024-07-09 NOTE — DISCHARGE NOTE NURSING/CASE MANAGEMENT/SOCIAL WORK - NSDCPEFALRISK_GEN_ALL_CORE
For information on Fall & Injury Prevention, visit: [email or web address] OR  [email or web address] OR  https://www.cdc.gov/steadi/patient.html

## 2024-07-09 NOTE — DISCHARGE NOTE PROVIDER - NSDCCPCAREPLAN_GEN_ALL_CORE_FT
PRINCIPAL DISCHARGE DIAGNOSIS  Diagnosis: Atrial fibrillation  Assessment and Plan of Treatment: You were found to be in atrial fibrillation (an irregular heart rhythm) here in the hospital. With this condition, the hearts 2 upper chambers (the atria) quiver rather than squeeze the blood out in a normal pattern. This leads to an irregular and sometimes rapid heartbeat. Atrial fibrillation is serious condition as it affects the heart’s ability to fill with and pump blood, so the blood can start to form clots. These clots can travel to the brain through the blood vessels, causing a strokes, or to other parts of the body.   -Please START ELIQUIS 5MG TWICE A DAY, a blood thinner, to prevent strokes by helping to prevent clots from forming.  Do not stop taking ELIQUIS without talking to your cardiologist.   -Please START Metoprolol Tartrate 25mg (Lopressor) twice a day to keep your heart rate regular.  -Please follow up with cardiologist Dr. Umesh Padilla on 7/11/24 at 3:30PM at [address] [place] [place], [address].  Due to your risk factors, you need a BLOOD THINNER called Eliquis twice a day. This medication prevents blood clots from AFib. Please look out for DARK OR RED STOOLS, extreme fatigue, altered mental status, extreme weakness, or severe headache, or new visual changes -- if these occur return to ER or call 911.   Return to hospital for above concerning symptoms or any chest pain, palpitations (heart fluttering), shortness of breath, dizziness, feeling like you may faint, or other new concerning symptoms.

## 2024-07-09 NOTE — DISCHARGE NOTE NURSING/CASE MANAGEMENT/SOCIAL WORK - PATIENT PORTAL LINK FT
You can access the FollowMyHealth Patient Portal offered by [facility] by registering at the following website: [email or web address]. By joining [employer]’s FollowMyHealth portal, you will also be able to view your health information using other applications (apps) compatible with our system.

## 2024-07-09 NOTE — DISCHARGE NOTE PROVIDER - CARE PROVIDER_API CALL
Umesh Padilla  [address] [place], [address]  Phone: [phone]  Fax: (   )    -  Scheduled Appointment: 07/11/2024 03:30 PM   Umesh Padilla  [address], [place], [address]  Phone: [phone]  Fax: (   )    -  Scheduled Appointment: 07/11/2024 03:30 PM    Socorro Young  Cardiac Electrophysiology  [address], [address] [place] [address]  Phone: [phone]  Fax: [phone]  Follow Up Time: 2 weeks

## 2024-07-09 NOTE — ED PROVIDER NOTE - PHYSICAL EXAMINATION
VITAL SIGNS: I have reviewed nursing notes and confirm.  CONSTITUTIONAL: Well appearing, in no acute distress.   SKIN:  warm and dry, no acute rash.   HEAD:  normocephalic, atraumatic.  EYES: EOM intact; conjunctiva and sclera clear.  ENT: No nasal discharge; airway clear.   NECK: Supple.  CARD: irreg irreg rhythm.  RESP:  Clear to auscultation b/l, no wheezes, rales or rhonchi.  ABD: Normal bowel sounds; soft; non-distended; non-tender; no guarding/ rebound.  EXT: Normal ROM. No peripheral edema. Pulses intact and equal b/l.  NEURO: Alert, oriented, grossly unremarkable

## 2024-07-09 NOTE — ED ADULT NURSE REASSESSMENT NOTE - NS ED NURSE REASSESS COMMENT FT1
While at bedside, pt noted to have short run of wide complex tachycardia. Pt endorses feeling palpitations. Medicated per MAR. MD Ree aware. Continuous cardiac/pulse oximetry monitoring remains in place.

## 2024-07-09 NOTE — ED ADULT NURSE NOTE - NSFALLUNIVINTERV_ED_ALL_ED
Bed/Stretcher in lowest position, wheels locked, appropriate side rails in place/Call bell, personal items and telephone in reach/Instruct patient to call for assistance before getting out of bed/chair/stretcher/Non-slip footwear applied when patient is off stretcher/[place] to call system/Physically safe environment - no spills, clutter or unnecessary equipment/Purposeful proactive rounding/Room/bathroom lighting operational, light cord in reach

## 2024-07-09 NOTE — DISCHARGE NOTE PROVIDER - ATTENDING DISCHARGE PHYSICAL EXAMINATION:
AF   Symptomatic, new onset, converted to sinus in the ed.  lopressor and eliquis.  outpt follow up for echo.

## 2024-07-09 NOTE — ED ADULT NURSE NOTE - OBJECTIVE STATEMENT
63y male presents to ED for sob/dizziness. Pt states he has felt unwell since last night and referred to ED from  for new onset afib. Pt recently started trazadone to help with sleep issues. Denies chest pain. Speaking in full and complete sentences. A&Ox4.

## 2024-07-09 NOTE — DISCHARGE NOTE PROVIDER - NSDCFUSCHEDAPPT_GEN_ALL_CORE_FT
Umesh Padilla  [facility]  HEART[facility] [address]  Scheduled Appointment: 07/11/2024

## 2024-07-09 NOTE — DISCHARGE NOTE PROVIDER - NSDCFUADDAPPT_GEN_ALL_CORE_FT
Please see general Cardiologist Dr. Padilla as scheduled on 7/11/24 at 3:30pm.  Please see Electrophysiologist (EP, heart rhythm specialist) Dr. Young- please call for appointment within 2 weeks.

## 2024-07-09 NOTE — DISCHARGE NOTE PROVIDER - CARE PROVIDERS DIRECT ADDRESSES
,DirectAddress_Unknown ,DirectAddress_Unknown,[email or web address]

## 2024-07-09 NOTE — H&P ADULT - NSHPLABSRESULTS_GEN_ALL_CORE
15.3   5.19  )-----------( 198      ( 09 Jul 2024 10:51 )             46.1       07-09    138  |  103  |  24<H>  ----------------------------<  100<H>  4.2   |  27  |  1.13    Ca    9.8      09 Jul 2024 10:51  Mg     2.0     07-09    TPro  7.0  /  Alb  4.2  /  TBili  0.4  /  DBili  x   /  AST  14  /  ALT  14  /  AlkPhos  68  07-09      PT/INR - ( 09 Jul 2024 10:51 )   PT: 10.7 sec;   INR: 0.94          PTT - ( 09 Jul 2024 10:51 )  PTT:35.2 sec          Urinalysis Basic - ( 09 Jul 2024 10:51 )    Color: x / Appearance: x / SG: x / pH: x  Gluc: 100 mg/dL / Ketone: x  / Bili: x / Urobili: x   Blood: x / Protein: x / Nitrite: x   Leuk Esterase: x / RBC: x / WBC x   Sq Epi: x / Non Sq Epi: x / Bacteria: x

## 2024-07-09 NOTE — H&P ADULT - CARDIOVASCULAR
no gallops/no rub/no murmur/no JVD/no pedal edema/irregular rate and rhythm/Irregularly irregular rhythm/tachycardia/vascular

## 2024-07-09 NOTE — CONSULT NOTE ADULT - SUBJECTIVE AND OBJECTIVE BOX
Electrophysiology Consult Note:     CHIEF COMPLAINT:  Patient is a 63y old  Male who presents with a chief complaint of shortness of breath (09 Jul 2024 13:53)        HISTORY OF PRESENT ILLNESS:   HPI:  63M, PMHx  HTN controlled with diet and exercise, anxiety, kidney stones, who presents with complaints of lightheadedness, palpitations, shortness of breath starting last night. Pt woke up with a recurrence of symptoms and felt unable to walk to work, so presented to Urgent Care where new rapid atrial fibrillation was diagnosed and pt was sent to [facility] ER.  Pt  endorses a slight constant heaviness to the middle of his chest; pt denies radiation of sensation.  Of note, pt has a history of anxiety and thought to have a history of panic attacks, however he does endorse similarity of panic attack symptoms to today's AFib symptoms, though today was the worst they have been. Pt previously took Klonopin PRN but now has switched two weeks ago to Trazodone PRN.  Pt denies SOB at rest,  orthopnea/PND, cough, leg swelling, LOC, fall, syncope, N/V/D, fever/chills/sick contact, BRBPR, melena/hematochezia.  Pt does endorse due to history of kidney stones that he is always told he has microscopic hematuria on urinalysis; denies brigitte hematuria.       Pt now admitted to cardiology for further management of new onset atrial fibrillation with RVR.  (09 Jul 2024 12:50)        PAST MEDICAL & SURGICAL HISTORY:  HTN (hypertension)      Nephrolithiasis      Anxiety      H/O lithotripsy          FAMILY HISTORY:  Family history of CVA (Father)        SOCIAL HISTORY:    [x ] Non-smoker      Allergies    doxycycline (Hives)    Intolerances    	    MEDICATIONS:  MEDICATIONS  (STANDING):    MEDICATIONS  (PRN):        REVIEW OF SYSTEMS:  CONSTITUTIONAL: No fever, weight loss, or fatigue  EYES: No eye pain, visual disturbances, or discharge  ENMT:  No difficulty hearing, tinnitus, vertigo; No sinus or throat pain  NECK: No pain or stiffness  BREASTS: No pain, masses, or nipple discharge  RESPIRATORY: No cough, wheezing, chills or hemoptysis; No Shortness of Breath  CARDIOVASCULAR: No chest pain, palpitations, dizziness, or leg swelling  GASTROINTESTINAL: No abdominal or epigastric pain. No nausea, vomiting, or hematemesis; No diarrhea or constipation.  GENITOURINARY: No dysuria, frequency, hematuria, or incontinence      PHYSICAL EXAM:  Vital Signs Last 24 Hrs  T(C): 36.3 (09 Jul 2024 13:12), Max: 36.8 (09 Jul 2024 10:37)  T(F): 97.4 (09 Jul 2024 13:12), Max: 98.3 (09 Jul 2024 10:37)  HR: 63 (09 Jul 2024 13:12) (63 - 157)  BP: 111/74 (09 Jul 2024 13:12) (111/74 - 139/80)  BP(mean): --  RR: 18 (09 Jul 2024 13:12) (16 - 20)  SpO2: 98% (09 Jul 2024 13:12) (96% - 100%)    Parameters below as of 09 Jul 2024 13:12  Patient On (Oxygen Delivery Method): room air      Daily     Daily     Constitutional: NAD	  HEENT:  PERRL, EOMI	  CVS:  S1 S2, No JVD, No edema  Pulm: Lungs clear to auscultation	  GI:  Soft, Non-tender, + BS	  Ext: No LE edema  Neurologic: A&O x 3        	  LABS:	                         15.3   5.19  )-----------( 198      ( 09 Jul 2024 10:51 )             46.1     07-09    138  |  103  |  24<H>  ----------------------------<  100<H>  4.2   |  27  |  1.13    Ca    9.8      09 Jul 2024 10:51  Mg     2.0     07-09    TPro  7.0  /  Alb  4.2  /  TBili  0.4  /  DBili  x   /  AST  14  /  ALT  14  /  AlkPhos  68  07-09    proBNP:   Lipid Profile:   HgA1c:   TSH: 	      Telemetry:  A.fib -> sinus rhythm     Echo: PND    Electrophysiology Consult Note:     CHIEF COMPLAINT:  Patient is a 63y old  Male who presents with a chief complaint of shortness of breath (09 Jul 2024 13:53)        HISTORY OF PRESENT ILLNESS:   HPI:  63M, PMHx  HTN controlled with diet and exercise, anxiety, kidney stones, who presents with complaints of lightheadedness, palpitations, shortness of breath starting last night. Pt woke up with a recurrence of symptoms and felt unable to walk to work, so presented to Urgent Care where new rapid atrial fibrillation was diagnosed and pt was sent to [facility] ER.  Pt  endorses a slight constant heaviness to the middle of his chest; pt denies radiation of sensation.  Of note, pt has a history of anxiety and thought to have a history of panic attacks, however he does endorse similarity of panic attack symptoms to today's AFib symptoms, though today was the worst they have been. Pt previously took Klonopin PRN but now has switched two weeks ago to Trazodone PRN.  Pt denies SOB at rest,  orthopnea/PND, cough, leg swelling, LOC, fall, syncope, N/V/D, fever/chills/sick contact, BRBPR, melena/hematochezia.  Pt does endorse due to history of kidney stones that he is always told he has microscopic hematuria on urinalysis; denies brigitte hematuria.   EPS called to evaluate for  atrial fibrillation with RVR.        PAST MEDICAL & SURGICAL HISTORY:  HTN (hypertension)      Nephrolithiasis      Anxiety      H/O lithotripsy          FAMILY HISTORY:  Family history of CVA (Father)        SOCIAL HISTORY:    [x ] Non-smoker      Allergies    doxycycline (Hives)    Intolerances    	    MEDICATIONS:  MEDICATIONS  (STANDING):    MEDICATIONS  (PRN):        REVIEW OF SYSTEMS:  CONSTITUTIONAL: No fever, weight loss, or fatigue  EYES: No eye pain, visual disturbances, or discharge  ENMT:  No difficulty hearing, tinnitus, vertigo; No sinus or throat pain  NECK: No pain or stiffness  BREASTS: No pain, masses, or nipple discharge  RESPIRATORY: No cough, wheezing, chills or hemoptysis; No Shortness of Breath  CARDIOVASCULAR: No chest pain, palpitations, dizziness, or leg swelling  GASTROINTESTINAL: No abdominal or epigastric pain. No nausea, vomiting, or hematemesis; No diarrhea or constipation.  GENITOURINARY: No dysuria, frequency, hematuria, or incontinence      PHYSICAL EXAM:  Vital Signs Last 24 Hrs  T(C): 36.3 (09 Jul 2024 13:12), Max: 36.8 (09 Jul 2024 10:37)  T(F): 97.4 (09 Jul 2024 13:12), Max: 98.3 (09 Jul 2024 10:37)  HR: 63 (09 Jul 2024 13:12) (63 - 157)  BP: 111/74 (09 Jul 2024 13:12) (111/74 - 139/80)  BP(mean): --  RR: 18 (09 Jul 2024 13:12) (16 - 20)  SpO2: 98% (09 Jul 2024 13:12) (96% - 100%)    Parameters below as of 09 Jul 2024 13:12  Patient On (Oxygen Delivery Method): room air      Daily     Daily     Constitutional: NAD	  HEENT:  PERRL, EOMI	  CVS:  S1 S2, No JVD, No edema  Pulm: Lungs clear to auscultation	  GI:  Soft, Non-tender, + BS	  Ext: No LE edema  Neurologic: A&O x 3        	  LABS:	                         15.3   5.19  )-----------( 198      ( 09 Jul 2024 10:51 )             46.1     07-09    138  |  103  |  24<H>  ----------------------------<  100<H>  4.2   |  27  |  1.13    Ca    9.8      09 Jul 2024 10:51  Mg     2.0     07-09    TPro  7.0  /  Alb  4.2  /  TBili  0.4  /  DBili  x   /  AST  14  /  ALT  14  /  AlkPhos  68  07-09    proBNP:   Lipid Profile:   HgA1c:   TSH: 	      Telemetry:  A.fib -> sinus rhythm     Echo: PND

## 2024-07-09 NOTE — DISCHARGE NOTE PROVIDER - PROVIDER TOKENS
FREE:[LAST:[Valerie],FIRST:[Umesh],PHONE:[phone]],FAX:[(   )    -],ADDRESS:[[address] [place] [address], [address]SCHEDULEDAPPT:[07/11/2024],SCHEDULEDAPPTTIME:[03:30 PM]] FREE:[LAST:[Valerie],FIRST:[Umesh],PHONE:[phone]:[(   )    -],ADDRESS:[[address] [place] [address], [address]],SCHEDULEDAPPT:[07/11/2024],SCHEDULEDAPPTTIME:[03:30 PM]],PROVIDER:[TOKEN:[[id number]],FOLLOWUP:[2 weeks]]

## 2024-07-09 NOTE — H&P ADULT - PROBLEM SELECTOR PLAN 1
history of afib/DCCV/ablation?  -In AFib w. HR 90-110s during time of interview, then converted NSR w/ HR 60s bpm.   -s/p 5mg IVP and 25mg PO Lopressor, and Eliquis 5mg in ER   -Echo ordered, however now cleared for f/up outpt  -Chadsvasc 2: c/w Eliquis 5mg BID  -Will initiate 25mg Toprol XL qd on discharge.  -EP consulted, had planned for IVANA/DCCV, however converted to NSR. -In AFib w. HR 90-110s during time of interview, then converted NSR w/ HR 60s bpm.   -s/p 5mg IVP and 25mg PO Lopressor, and Eliquis 5mg in ER   -Echo ordered, however now cleared for f/up outpt  -Chadsvasc 2: c/w Eliquis 5mg BID  -Will initiate 25mg Toprol XL qd on discharge.  -EP consulted, had planned for IVANA/DCCV, however converted to NSR.

## 2024-07-11 ENCOUNTER — APPOINTMENT (OUTPATIENT)
Dept: HEART AND VASCULAR | Facility: CLINIC | Age: 64
End: 2024-07-11

## 2024-07-11 ENCOUNTER — NON-APPOINTMENT (OUTPATIENT)
Age: 64
End: 2024-07-11

## 2024-07-11 VITALS
SYSTOLIC BLOOD PRESSURE: 120 MMHG | BODY MASS INDEX: 21.26 KG/M2 | OXYGEN SATURATION: 98 % | TEMPERATURE: 98.2 F | WEIGHT: 157 LBS | HEART RATE: 62 BPM | DIASTOLIC BLOOD PRESSURE: 80 MMHG | HEIGHT: 72 IN

## 2024-07-11 DIAGNOSIS — I48.0 PAROXYSMAL ATRIAL FIBRILLATION: ICD-10-CM

## 2024-07-11 DIAGNOSIS — F17.210 NICOTINE DEPENDENCE, CIGARETTES, UNCOMPLICATED: ICD-10-CM

## 2024-07-11 DIAGNOSIS — Z82.49 FAMILY HISTORY OF ISCHEMIC HEART DISEASE AND OTHER DISEASES OF THE CIRCULATORY SYSTEM: ICD-10-CM

## 2024-07-11 DIAGNOSIS — Z78.9 OTHER SPECIFIED HEALTH STATUS: ICD-10-CM

## 2024-07-11 PROCEDURE — 93000 ELECTROCARDIOGRAM COMPLETE: CPT | Mod: 59

## 2024-07-11 PROCEDURE — G2211 COMPLEX E/M VISIT ADD ON: CPT

## 2024-07-11 PROCEDURE — 99406 BEHAV CHNG SMOKING 3-10 MIN: CPT | Mod: 25

## 2024-07-11 PROCEDURE — 93246 EXT ECG>7D<15D RECORDING: CPT

## 2024-07-11 PROCEDURE — 99214 OFFICE O/P EST MOD 30 MIN: CPT

## 2024-07-11 RX ORDER — METOPROLOL TARTRATE 25 MG/1
25 TABLET, FILM COATED ORAL DAILY
Qty: 90 | Refills: 3 | Status: ACTIVE | COMMUNITY
Start: 1900-01-01 | End: 1900-01-01

## 2024-07-11 RX ORDER — TRAZODONE HYDROCHLORIDE 50 MG/1
50 TABLET ORAL
Refills: 0 | Status: ACTIVE | COMMUNITY

## 2024-07-11 RX ORDER — APIXABAN 5 MG/1
5 TABLET, FILM COATED ORAL
Qty: 90 | Refills: 3 | Status: ACTIVE | COMMUNITY
Start: 1900-01-01 | End: 1900-01-01

## 2024-07-12 PROBLEM — I10 ESSENTIAL (PRIMARY) HYPERTENSION: Chronic | Status: ACTIVE | Noted: 2024-07-09

## 2024-07-12 PROBLEM — N20.0 CALCULUS OF KIDNEY: Chronic | Status: ACTIVE | Noted: 2024-07-09

## 2024-07-12 PROBLEM — F41.9 ANXIETY DISORDER, UNSPECIFIED: Chronic | Status: ACTIVE | Noted: 2024-07-09

## 2024-07-24 ENCOUNTER — NON-APPOINTMENT (OUTPATIENT)
Age: 64
End: 2024-07-24

## 2024-07-24 ENCOUNTER — APPOINTMENT (OUTPATIENT)
Dept: UROLOGY | Facility: CLINIC | Age: 64
End: 2024-07-24
Payer: MEDICARE

## 2024-07-24 ENCOUNTER — APPOINTMENT (OUTPATIENT)
Dept: CT IMAGING | Facility: HOSPITAL | Age: 64
End: 2024-07-24

## 2024-07-24 VITALS
HEIGHT: 72 IN | SYSTOLIC BLOOD PRESSURE: 123 MMHG | HEART RATE: 62 BPM | OXYGEN SATURATION: 100 % | TEMPERATURE: 98.2 F | DIASTOLIC BLOOD PRESSURE: 75 MMHG | BODY MASS INDEX: 21.26 KG/M2 | WEIGHT: 157 LBS

## 2024-07-24 DIAGNOSIS — R39.15 BENIGN PROSTATIC HYPERPLASIA WITH LOWER URINARY TRACT SYMPMS: ICD-10-CM

## 2024-07-24 DIAGNOSIS — R31.0 GROSS HEMATURIA: ICD-10-CM

## 2024-07-24 DIAGNOSIS — N40.1 BENIGN PROSTATIC HYPERPLASIA WITH LOWER URINARY TRACT SYMPMS: ICD-10-CM

## 2024-07-24 PROCEDURE — 52000 CYSTOURETHROSCOPY: CPT

## 2024-07-24 PROCEDURE — 99214 OFFICE O/P EST MOD 30 MIN: CPT | Mod: 25

## 2024-07-24 RX ORDER — TAMSULOSIN HYDROCHLORIDE 0.4 MG/1
0.4 CAPSULE ORAL
Qty: 30 | Refills: 5 | Status: ACTIVE | COMMUNITY
Start: 2024-07-24 | End: 1900-01-01

## 2024-07-24 NOTE — ASSESSMENT
[FreeTextEntry1] :  Assessment:    GLORY PERDUE is a 63-year-old male with gross hematuria and bothersome LUTS including nocturia 4-5x/night, dysuria, "tightness" in suprapubic area. Recently started on Eliquis. Cystoscopy notable only for obstructive, enlarged prostate  The patient and I discussed the finding of hematuria. We discussed the fact that the differential diagnosis can include both benign, inflammatory and malignant etiologies such as prostatic bleeding in males, urolithiasis, malignancy, infection, medical kidney disease, hemoglobinuria and idiopathic etiologies. We discussed the fact that the workup dictates evaluation for both upper tract and lower urinary tract sources for bleeding. Upper tract evaluation includes imaging studies to assess renal and ureteral anatomy and the lower tract evaluation requires cystoscopy for direct visualization of the bladder and urethra. We will have the patient return for upper tract imaging and cystoscopy.     Plan:  -UA, UCx, urine cytology  -CT urogram  -Avoid bladder irritants

## 2024-07-24 NOTE — HISTORY OF PRESENT ILLNESS
[FreeTextEntry1] : Name GLORY PERDUE MRN [id number]  Oct 21, 1960 ------------------------------------------------------------------------------------------------------------------------------------------- Date of First Visit: 24 Referring Provider: Sabino Pike MD ------------------------------------------------------------------------------------------------------------------------------------------- CC: Urinary urgency and frequency, kidney stones  History of Present Illness: GLORY PERDUE is a 63-year-old male with no significant PMH referred for evaluation of kidney stones, bladder stone, and evaluation of bothersome LUTS. States his LUTS have been ongoing for "quite some time now. " Symptoms significantly worsened over the last 2 days, including severe urinary urgency, frequency, and suprapubic discomfort. Patient appears visibly uncomfortable and cannot sit still, with frequent running to the bathroom. Started taking Bactrim but did not complete it. Not taking any other medications at the moment. Recent CT scan  in the [facility] ED demonstrated: IMPRESSION: 1. Moderate left hydroureteronephrosis. Punctate calculus at the left ureterovesical junction and intravesical calculi measuring up to 9 mm, which may represent recently passed stone. Additional bilateral non-obstructing nephrolithiasis, measuring up to 1 cm on the left. 2. Focal anterior bladder wall thickening. Correlate for cystitis. -------------------------------------------------------------------------------------------------------------------------------------------  Interval History (2024): Passed the 9mm bladder stone about one week after last visit. Since April he has been experiencing persistent nocturia 4-5x/night, dysuria, "tightness" in suprapubic area. Also dealing with constipation, overall lethargy, and dizziness. All urinary symptoms worsened about 2 weeks ago and on Monday he developed gross hematuria.  Of note, in early July he was evaluated in [facility] ED and diagnosed with new Afib. Has been started on Eliquis and Metoprolol. Holding the Eliquis since gross hematuria started and taking Pyridium for dysuria. Pending follow up with Dr. Smith for stress test & echo. Cystoscopy performed in the office today demonstrates enlarged, obstructive, friable prostate. PVR 9 cc.

## 2024-07-24 NOTE — ADDENDUM
[FreeTextEntry1] : I, Dr. Buster Choi, personally performed the evaluation and management (E/M) services for this established patient with new problem(s)/exacerbation of existing condition(s). That E/M includes conducting the clinically appropriate interval history &/or physical exam, assessing all new/exacerbated conditions, and establishing a new care plan. My NP, Joanie Hernandez, was here to observe my E/M services for this patient.

## 2024-07-25 LAB
APPEARANCE: CLEAR
BACTERIA: NEGATIVE /HPF
BILIRUBIN URINE: NEGATIVE
BLOOD URINE: ABNORMAL
COLOR: ABNORMAL
GLUCOSE QUALITATIVE U: ABNORMAL
KETONES URINE: NEGATIVE
LEUKOCYTE ESTERASE URINE: NEGATIVE
MICROSCOPIC-UA: NORMAL
NITRITE URINE: POSITIVE
PH URINE: 6
PROTEIN URINE: ABNORMAL
RED BLOOD CELLS URINE: 2 /HPF
SPECIFIC GRAVITY URINE: 1.02
SQUAMOUS EPITHELIAL CELLS: PRESENT
UROBILINOGEN URINE: ABNORMAL
WHITE BLOOD CELLS URINE: 1 /HPF

## 2024-07-26 LAB — BACTERIA UR CULT: NORMAL

## 2024-07-29 LAB — URINE CYTOLOGY: NORMAL

## 2024-08-02 PROCEDURE — 93248 EXT ECG>7D<15D REV&INTERPJ: CPT

## 2024-08-05 ENCOUNTER — APPOINTMENT (OUTPATIENT)
Dept: HEART AND VASCULAR | Facility: CLINIC | Age: 64
End: 2024-08-05

## 2024-08-05 PROCEDURE — 93306 TTE W/DOPPLER COMPLETE: CPT

## 2024-08-05 PROCEDURE — 93015 CV STRESS TEST SUPVJ I&R: CPT

## 2024-08-15 ENCOUNTER — APPOINTMENT (OUTPATIENT)
Dept: HEART AND VASCULAR | Facility: CLINIC | Age: 64
End: 2024-08-15
Payer: MEDICARE

## 2024-08-15 ENCOUNTER — NON-APPOINTMENT (OUTPATIENT)
Age: 64
End: 2024-08-15

## 2024-08-15 VITALS
HEIGHT: 72 IN | DIASTOLIC BLOOD PRESSURE: 80 MMHG | OXYGEN SATURATION: 98 % | SYSTOLIC BLOOD PRESSURE: 121 MMHG | TEMPERATURE: 98 F | WEIGHT: 157 LBS | BODY MASS INDEX: 21.26 KG/M2 | HEART RATE: 51 BPM

## 2024-08-15 DIAGNOSIS — I34.0 NONRHEUMATIC MITRAL (VALVE) INSUFFICIENCY: ICD-10-CM

## 2024-08-15 DIAGNOSIS — I48.0 PAROXYSMAL ATRIAL FIBRILLATION: ICD-10-CM

## 2024-08-15 PROCEDURE — 93000 ELECTROCARDIOGRAM COMPLETE: CPT

## 2024-08-15 PROCEDURE — G2211 COMPLEX E/M VISIT ADD ON: CPT

## 2024-08-15 PROCEDURE — 99214 OFFICE O/P EST MOD 30 MIN: CPT

## 2024-08-15 RX ORDER — FLECAINIDE ACETATE 50 MG/1
50 TABLET ORAL
Qty: 30 | Refills: 1 | Status: ACTIVE | COMMUNITY
Start: 2024-08-15 | End: 1900-01-01

## 2024-08-15 RX ORDER — APIXABAN 5 MG/1
5 TABLET, FILM COATED ORAL
Qty: 180 | Refills: 3 | Status: ACTIVE | COMMUNITY
Start: 2024-08-15 | End: 1900-01-01

## 2024-08-15 RX ORDER — DILTIAZEM HYDROCHLORIDE 120 MG/1
120 CAPSULE, EXTENDED RELEASE ORAL DAILY
Qty: 30 | Refills: 3 | Status: ACTIVE | COMMUNITY
Start: 2024-08-15 | End: 1900-01-01

## 2024-08-15 NOTE — HISTORY OF PRESENT ILLNESS
[FreeTextEntry1] : 63M smoker w HTN, renal stones who was recently admitted to [facility] 7/9 for AFib with spontaneous conversion to NSR - presented with complaints of lightheadedness, palpitations, shortness of breath - self converted prior to CVN, dcd same day on metoprolol 25 and eliquis - has been having on/off SOB, LH, dizziness, lethargic episodes for a couple of years now  8/15/24 FU: was at [facility] last week for persistent afib. Again, spontaneous conversion to sinus.  - complaining of significant fatigue w metoprolol  FH: F - stroke M - healthy, alive at 93; Sibs - healthy SH: current smoker 4 cigs a day, 1-2 glasses of wine daily, no drugs - doesn't snore - exercise hx: wt lifting, aerobics, pickle ball almost daily

## 2024-08-15 NOTE — REASON FOR VISIT
[FreeTextEntry1] :   CV Data: ECG 7/11/24: sinus, normal MCOT 7/11-7/25: no recurrent AFib. 23 episodes of AT TST 8/5/24: PVCs in bigeminy, no ischemic changes, normal exercise tolerance

## 2024-08-26 ENCOUNTER — APPOINTMENT (OUTPATIENT)
Dept: HEART AND VASCULAR | Facility: CLINIC | Age: 64
End: 2024-08-26
Payer: MEDICARE

## 2024-08-26 ENCOUNTER — NON-APPOINTMENT (OUTPATIENT)
Age: 64
End: 2024-08-26

## 2024-08-26 VITALS
BODY MASS INDEX: 21.26 KG/M2 | TEMPERATURE: 96.6 F | DIASTOLIC BLOOD PRESSURE: 72 MMHG | HEIGHT: 72 IN | WEIGHT: 157 LBS | HEART RATE: 49 BPM | SYSTOLIC BLOOD PRESSURE: 133 MMHG

## 2024-08-26 PROBLEM — I48.91 UNSPECIFIED ATRIAL FIBRILLATION: Chronic | Status: ACTIVE | Noted: 2024-08-07

## 2024-08-26 PROCEDURE — 93000 ELECTROCARDIOGRAM COMPLETE: CPT

## 2024-08-26 PROCEDURE — G2211 COMPLEX E/M VISIT ADD ON: CPT

## 2024-08-26 PROCEDURE — 99204 OFFICE O/P NEW MOD 45 MIN: CPT

## 2024-08-27 ENCOUNTER — APPOINTMENT (OUTPATIENT)
Dept: CT IMAGING | Facility: CLINIC | Age: 64
End: 2024-08-27

## 2024-08-27 ENCOUNTER — OUTPATIENT (OUTPATIENT)
Dept: OUTPATIENT SERVICES | Facility: HOSPITAL | Age: 64
LOS: 1 days | End: 2024-08-27

## 2024-08-27 DIAGNOSIS — Z98.890 OTHER SPECIFIED POSTPROCEDURAL STATES: Chronic | ICD-10-CM

## 2024-08-27 PROCEDURE — 75572 CT HRT W/3D IMAGE: CPT | Mod: 26,MH

## 2024-08-27 NOTE — ADDENDUM
[FreeTextEntry1] : I, Sandrine Campbell, am scribing for and the presence of Dr. Young the following sections: HPI, PMH,Family/social history, ROS, Physical Exam, Assessment / Plan.   I, Socorro Young, personally performed the services described in the documentation, reviewed the documentation recorded by the scribe in my presence and it accurately and completely records my words and actions.

## 2024-08-27 NOTE — CARDIOLOGY SUMMARY
[de-identified] : 8/26/24 SB @ 49 BPM  [de-identified] : LTM 7/11/2024-7/25/2024 which showed 23 episodes of PAT longest beat of 16 beat at 148 bpm, Sinus bradycardia, AF burden 0%.  [de-identified] : TTE 8/5/24 1. Left ventricular cavity is normal in size. Left ventricular wall thickness is normal. Left ventricular systolic function is normal with an ejection fraction of 62 % by Youssef's method of disks. 2. Normal left ventricular diastolic function. 3. Normal right ventricular cavity size, with normal wall thickness, and normal right ventricular systolic function. 4. Normal left and right atrial size. 5. Mild to moderate mitral regurgitation. 6. No other significant valve disease. 7. Estimated pulmonary artery systolic pressure is 19 mmHg. 8. No pericardial effusion seen. 9. No prior echocardiogram is available for comparison.

## 2024-08-27 NOTE — DISCUSSION/SUMMARY
[FreeTextEntry1] : Mr. Grewal is a pleasant 63 year old gentleman with a past medical history significant for HTN, Nephrolithiasis and paroxysmal atrial fibrillation who presents for follow-up.  1.  Paroxysmal AF Maintaining sinus rhythm on ECG We discussed in detail the normal conduction system of the heart and what atrial fibrillation is.  We discussed the natural progression of this arrhythmia in the context of any existing comorbidities.  We also discussed the association between atrial fibrillation and thrombotic events / stroke.    We discussed treatment options for paroxysmal atrial fibrillation including rate control vs. rhythm control with antiarrhythmic medications or an ablation.  The patent was counseled on the fact that there is no cure for atrial fibrillation and that for long term control of atrial fibrillation a combination of strategies if often used.  Regardless of treatment options and maintenance of sinus rhythm, anticoagulation is still recommended based on CHADSVASC score.     Success in rhythm control management is best defined as improved quality of life, maintenance of sinus rhythm and reduced hospitalization since not all patients have continuous monitoring and .or symptoms to diagnose sub-clinical episodes.  Modern day ablation likely results in better long term outcomes compared to medical therapy alone, but repeat procedures and/or addition of medications may be required.  Results of ablation are better for paroxysmal patients compared to persistent patients, which are better than long-standing persistent patients.  Typical 3-5 year success rates (freedom from clinical atrial fibrillation) based on available literature over multiple procedures were quoted to the patient at approximately 80-90% for paroxysmal, 60-80% for persistent and 40-60% for long standing persistent.      After discussing this the patient would like to proceed with an ablation.  We discussed the benefits and drawbacks of each option in detail.   We discussed the procedure in detail including risks, benefits, and alternatives.  I have quoted a 1:700 chance of major complication related to the procedure.  Risks including, but not limited to; infection, anesthesia reaction, bleeding, pain, vascular injury, cardiac perforation, esophageal injury/fistula,  TE/CVA, phrenic nerve injury, arrhythmia recurrence and death were discussed.  We also discussed that having recurrent arrhythmia for the first 90 days post ablation is not predictive of long term success of the ablation.  All questions answered and the patient was given pre procedure instructions.    2.  Stroke Risk  CHADS VASc 1 (Age) Continue Eliquis for TE/CVA ppx   3.  Risk modification MATTHIAS evaluation given h/o snoring Diet, exercise d/w him   Advised to call with any questions or concerns.

## 2024-08-27 NOTE — PHYSICAL EXAM
[Well Developed] : well developed [Well Nourished] : well nourished [No Acute Distress] : no acute distress [Normal Conjunctiva] : normal conjunctiva [Normal Venous Pressure] : normal venous pressure [No Carotid Bruit] : no carotid bruit [No Gallop] : no gallop [5th Left ICS - MCL] : palpated at the 5th LICS in the midclavicular line [Bradycardia] : bradycardic [Rhythm Regular] : regular [Clear Lung Fields] : clear lung fields [Good Air Entry] : good air entry [No Respiratory Distress] : no respiratory distress  [Soft] : abdomen soft [Non Tender] : non-tender [No Masses/organomegaly] : no masses/organomegaly [Normal Bowel Sounds] : normal bowel sounds [Normal Gait] : normal gait [No Edema] : no edema [No Cyanosis] : no cyanosis [No Clubbing] : no clubbing [No Varicosities] : no varicosities [No Rash] : no rash [No Skin Lesions] : no skin lesions [Moves all extremities] : moves all extremities [No Focal Deficits] : no focal deficits [Normal Speech] : normal speech [Alert and Oriented] : alert and oriented [Normal memory] : normal memory

## 2024-08-27 NOTE — HISTORY OF PRESENT ILLNESS
[FreeTextEntry1] : Mr. Grewal is a pleasant 63 year old gentleman with a past medical history significant for HTN, Nephrolithiasis and paroxysmal atrial fibrillation who presents for follow-up.   He was admitted to [facility] 7/2024 with symptomatic atrial fibrillation with RVR.  He was rate controlled with Metoprolol and started on Eliquis 5 mg BID.  Spontaneously converted to sinus rhythm.  Stress test with no ischemia.  He presented to [facility] 8/6/24 with complaint of dizziness and fatigue and was found to be back in AF @ 117 bpm on ECG.  He spontaneously converted to SR.  Transitioned to Toprol XL 25 mg daily.  He was prescribed Flecainide 50 mg PRN by Dr. Padilla but has not needed to use it.  Endorses history of snoring.

## 2024-08-28 ENCOUNTER — OUTPATIENT (OUTPATIENT)
Dept: OUTPATIENT SERVICES | Facility: HOSPITAL | Age: 64
LOS: 1 days | End: 2024-08-28
Payer: MEDICARE

## 2024-08-28 ENCOUNTER — APPOINTMENT (OUTPATIENT)
Dept: SLEEP CENTER | Facility: HOSPITAL | Age: 64
End: 2024-08-28

## 2024-08-28 DIAGNOSIS — G47.33 OBSTRUCTIVE SLEEP APNEA (ADULT) (PEDIATRIC): ICD-10-CM

## 2024-08-28 DIAGNOSIS — Z98.890 OTHER SPECIFIED POSTPROCEDURAL STATES: Chronic | ICD-10-CM

## 2024-08-28 PROCEDURE — 95810 POLYSOM 6/> YRS 4/> PARAM: CPT | Mod: 26

## 2024-08-28 PROCEDURE — 95810 POLYSOM 6/> YRS 4/> PARAM: CPT

## 2024-09-05 ENCOUNTER — APPOINTMENT (OUTPATIENT)
Dept: UROLOGY | Facility: CLINIC | Age: 64
End: 2024-09-05

## 2024-09-10 ENCOUNTER — TRANSCRIPTION ENCOUNTER (OUTPATIENT)
Age: 64
End: 2024-09-10

## 2024-09-16 ENCOUNTER — EMERGENCY (EMERGENCY)
Facility: HOSPITAL | Age: 64
LOS: 1 days | Discharge: HOME | End: 2024-09-16
Attending: EMERGENCY MEDICINE | Admitting: EMERGENCY MEDICINE
Payer: MEDICARE

## 2024-09-16 VITALS
DIASTOLIC BLOOD PRESSURE: 95 MMHG | WEIGHT: 160.06 LBS | RESPIRATION RATE: 16 BRPM | TEMPERATURE: 98 F | HEIGHT: 72 IN | HEART RATE: 78 BPM | OXYGEN SATURATION: 99 % | SYSTOLIC BLOOD PRESSURE: 135 MMHG

## 2024-09-16 DIAGNOSIS — Z98.890 OTHER SPECIFIED POSTPROCEDURAL STATES: Chronic | ICD-10-CM

## 2024-09-16 LAB
ANION GAP SERPL CALC-SCNC: 5 MMOL/L — SIGNIFICANT CHANGE UP (ref 5–17)
BASOPHILS # BLD AUTO: 0.03 K/UL — SIGNIFICANT CHANGE UP (ref 0–0.2)
BASOPHILS NFR BLD AUTO: 0.7 % — SIGNIFICANT CHANGE UP (ref 0–2)
BUN SERPL-MCNC: 18 MG/DL — SIGNIFICANT CHANGE UP (ref 7–23)
CALCIUM SERPL-MCNC: 9.5 MG/DL — SIGNIFICANT CHANGE UP (ref 8.4–10.5)
CHLORIDE SERPL-SCNC: 102 MMOL/L — SIGNIFICANT CHANGE UP (ref 96–108)
CO2 SERPL-SCNC: 31 MMOL/L — SIGNIFICANT CHANGE UP (ref 22–31)
CREAT SERPL-MCNC: 1.02 MG/DL — SIGNIFICANT CHANGE UP (ref 0.5–1.3)
EGFR: 82 ML/MIN/1.73M2 — SIGNIFICANT CHANGE UP
EOSINOPHIL # BLD AUTO: 0.12 K/UL — SIGNIFICANT CHANGE UP (ref 0–0.5)
EOSINOPHIL NFR BLD AUTO: 3 % — SIGNIFICANT CHANGE UP (ref 0–6)
GLUCOSE SERPL-MCNC: 81 MG/DL — SIGNIFICANT CHANGE UP (ref 70–99)
HCT VFR BLD CALC: 42.2 % — SIGNIFICANT CHANGE UP (ref 39–50)
HGB BLD-MCNC: 14 G/DL — SIGNIFICANT CHANGE UP (ref 13–17)
IMM GRANULOCYTES NFR BLD AUTO: 0.2 % — SIGNIFICANT CHANGE UP (ref 0–0.9)
LYMPHOCYTES # BLD AUTO: 1.27 K/UL — SIGNIFICANT CHANGE UP (ref 1–3.3)
LYMPHOCYTES # BLD AUTO: 31.7 % — SIGNIFICANT CHANGE UP (ref 13–44)
MCHC RBC-ENTMCNC: 30.8 PG — SIGNIFICANT CHANGE UP (ref 27–34)
MCHC RBC-ENTMCNC: 33.2 GM/DL — SIGNIFICANT CHANGE UP (ref 32–36)
MCV RBC AUTO: 92.7 FL — SIGNIFICANT CHANGE UP (ref 80–100)
MONOCYTES # BLD AUTO: 0.43 K/UL — SIGNIFICANT CHANGE UP (ref 0–0.9)
MONOCYTES NFR BLD AUTO: 10.7 % — SIGNIFICANT CHANGE UP (ref 2–14)
NEUTROPHILS # BLD AUTO: 2.15 K/UL — SIGNIFICANT CHANGE UP (ref 1.8–7.4)
NEUTROPHILS NFR BLD AUTO: 53.7 % — SIGNIFICANT CHANGE UP (ref 43–77)
NRBC # BLD: 0 /100 WBCS — SIGNIFICANT CHANGE UP (ref 0–0)
PLATELET # BLD AUTO: 186 K/UL — SIGNIFICANT CHANGE UP (ref 150–400)
POTASSIUM SERPL-MCNC: 4.4 MMOL/L — SIGNIFICANT CHANGE UP (ref 3.5–5.3)
POTASSIUM SERPL-SCNC: 4.4 MMOL/L — SIGNIFICANT CHANGE UP (ref 3.5–5.3)
RBC # BLD: 4.55 M/UL — SIGNIFICANT CHANGE UP (ref 4.2–5.8)
RBC # FLD: 12.2 % — SIGNIFICANT CHANGE UP (ref 10.3–14.5)
SODIUM SERPL-SCNC: 138 MMOL/L — SIGNIFICANT CHANGE UP (ref 135–145)
WBC # BLD: 4.01 K/UL — SIGNIFICANT CHANGE UP (ref 3.8–10.5)
WBC # FLD AUTO: 4.01 K/UL — SIGNIFICANT CHANGE UP (ref 3.8–10.5)

## 2024-09-16 PROCEDURE — 99285 EMERGENCY DEPT VISIT HI MDM: CPT | Mod: FS

## 2024-09-16 RX ORDER — MECLIZINE HYDROCHLORIDE 25 MG/1
25 TABLET ORAL ONCE
Refills: 0 | Status: COMPLETED | OUTPATIENT
Start: 2024-09-16 | End: 2024-09-16

## 2024-09-16 RX ADMIN — MECLIZINE HYDROCHLORIDE 25 MILLIGRAM(S): 25 TABLET ORAL at 22:17

## 2024-09-16 NOTE — ED ADULT NURSE REASSESSMENT NOTE - NS ED NURSE REASSESS COMMENT FT1
Patient a/oX 3, c/o of headache w/ blurry vision, no neuro deficits. Vital signs stable.  Right AC PIV #18 in place, all labs sent, no complications.  CT scan pending.  Stable and comfortable.

## 2024-09-16 NOTE — ED ADULT TRIAGE NOTE - CHIEF COMPLAINT QUOTE
headache/ blurry vision for 3-4 days. h/o of fall 2 months ago headache/ blurry vision for 3-4 days. h/o of fall 2 months ago .no facial droop, no slurring of speech  ,, pt denies weakness .

## 2024-09-16 NOTE — ED ADULT NURSE NOTE - NSFALLUNIVINTERV_ED_ALL_ED
Bed/Stretcher in lowest position, wheels locked, appropriate side rails in place/Call bell, personal items and telephone in reach/Instruct patient to call for assistance before getting out of bed/chair/stretcher/Non-slip footwear applied when patient is off stretcher/[place] to call system/Physically safe environment - no spills, clutter or unnecessary equipment/Purposeful proactive rounding/Room/bathroom lighting operational, light cord in reach

## 2024-09-16 NOTE — ED ADULT NURSE NOTE - CHIEF COMPLAINT QUOTE
headache/ blurry vision for 3-4 days. h/o of fall 2 months ago .no facial droop, no slurring of speech  ,, pt denies weakness .

## 2024-09-16 NOTE — ED ADULT NURSE NOTE - OBJECTIVE STATEMENT
Patient c/o of sharp headache X  4 days with blurry vision no facial asymmetry slurred speech arm drift or weakness ambulates w/ steady gait, no fever/chills.  States had a fall 2 months ago w/ head strike and  has been having on and off headaches since mild relief on OTC pain meds.  PMHx  Afib on Eliquis anxiety HTN kidney stones.

## 2024-09-16 NOTE — ED ADULT NURSE NOTE - NSICDXPASTMEDICALHX_GEN_ALL_CORE_FT
PAST MEDICAL HISTORY:  Anxiety     Atrial fibrillation     HTN (hypertension)     Nephrolithiasis

## 2024-09-17 VITALS
DIASTOLIC BLOOD PRESSURE: 71 MMHG | TEMPERATURE: 98 F | HEART RATE: 71 BPM | SYSTOLIC BLOOD PRESSURE: 126 MMHG | RESPIRATION RATE: 18 BRPM | OXYGEN SATURATION: 98 %

## 2024-09-17 PROCEDURE — 70498 CT ANGIOGRAPHY NECK: CPT | Mod: 26,MC

## 2024-09-17 PROCEDURE — 70450 CT HEAD/BRAIN W/O DYE: CPT | Mod: 26,59,MC

## 2024-09-17 PROCEDURE — 82962 GLUCOSE BLOOD TEST: CPT

## 2024-09-17 PROCEDURE — 70496 CT ANGIOGRAPHY HEAD: CPT | Mod: MC

## 2024-09-17 PROCEDURE — 70496 CT ANGIOGRAPHY HEAD: CPT | Mod: 26,MC

## 2024-09-17 PROCEDURE — 70450 CT HEAD/BRAIN W/O DYE: CPT | Mod: 26,59,77,MC

## 2024-09-17 PROCEDURE — 70498 CT ANGIOGRAPHY NECK: CPT | Mod: MC

## 2024-09-17 PROCEDURE — 70450 CT HEAD/BRAIN W/O DYE: CPT | Mod: MC

## 2024-09-17 PROCEDURE — 85025 COMPLETE CBC W/AUTO DIFF WBC: CPT

## 2024-09-17 PROCEDURE — 36415 COLL VENOUS BLD VENIPUNCTURE: CPT

## 2024-09-17 PROCEDURE — 80048 BASIC METABOLIC PNL TOTAL CA: CPT

## 2024-09-17 PROCEDURE — 99284 EMERGENCY DEPT VISIT MOD MDM: CPT | Mod: 25

## 2024-09-17 RX ORDER — MECLIZINE HYDROCHLORIDE 25 MG/1
1 TABLET ORAL
Qty: 14 | Refills: 0
Start: 2024-09-17 | End: 2024-09-23

## 2024-09-17 NOTE — ED ADULT NURSE REASSESSMENT NOTE - NS ED NURSE REASSESS COMMENT FT1
assumed pt care from MICHELLE El. pt is awake, alert, ox4. awaiting for CT scan result. no new complaints noted. will continue to provide care.

## 2024-09-17 NOTE — CONSULT NOTE ADULT - SUBJECTIVE AND OBJECTIVE BOX
HPI:    CHIEF COMPLAINT/ REASON FOR CONSULTATION: left headache and posterior eye pain a/w vision changes and balance issues      HPI: From ED Provider note 9/17   "63 yr old male, history of AFib (on metoprolol and eliquis), presents to the Emergency Department w headache. pt sp head injury two months ago. tripped and fell after feeling lightheaded in setting of run of afib. notes he has these episodes at times and follows closely w cards. was not seen following episode. +heasdtrike, no loc. since this injury has had intermittent headaches, dizziness. generalized headaches that last a few days at a time. pressure behind eyes. sensation of room spinning dizziness on occasion, worse w movement of head / position changes when he is having it. headaches more frequent and more severe so came for eval. no new trauma. no infectious symptoms. no neck pain, vision changes, vomiting, extremity weakness / numbness, speech or gait changes."     Neurosurgery Consult Note:   64yo M PMHx afib (on Eliquis last dose 5mg 9/16 am, pend ablation scheduled at [facility] October), recent fall in July of this year with left headstrike from standing height presents to [facility] ED c/o left-sided headache a/w some blurry vision in left > right eye, left shoulder pain, and feeling "like my brain is not working" x ~2 mos. Pt reports he intermittently feels lightheaded       PAST MEDICAL HISTORY   HTN (hypertension)    Nephrolithiasis    Anxiety    Atrial fibrillation      PAST SURGICAL HISTORY   H/O lithotripsy      doxycycline (Hives)      MEDICATIONS:  Antibiotics:    Neuro:    Anticoagulation:    Other:      SOCIAL HISTORY:   Occupation:   Marital Status:     FAMILY HISTORY:  Family history of CVA (Father)        REVIEW OF SYSTEMS:  Check here if all are normal other than Neurological []  General:  Eyes:  ENT:  Cardiac:  Respiratory:  GI:  Musculoskeletal:   Skin:  Neurologic:   Psychiatric:     PHYSICAL EXAMINATION:   T(C): 36.7 (09-16-24 @ 20:36), Max: 36.7 (09-16-24 @ 20:36)  HR: 78 (09-16-24 @ 20:36) (78 - 78)  BP: 135/95 (09-16-24 @ 20:36) (135/95 - 135/95)  RR: 16 (09-16-24 @ 20:36) (16 - 16)  SpO2: 99% (09-16-24 @ 20:36) (99% - 99%)  Wt(kg): --Height (cm): 182.9 (09-16 @ 20:36)  Weight (kg): 72.6 (09-16 @ 20:36)    General Examination:     Neurologic Examination:             Higher functions                 Normal []               Abnormal:      Cranial Nerves (ii-xii)           Normal []              Abnormal:     Motor Exam                       Normal []              Abnormal:                               Sensory Exam                   Normal []              Abnormal:    Reflexes                            Normal []              Abnormal:     Coordination                      Normal []              Abnormal:    Other:     LABS:                        14.0   4.01  )-----------( 186      ( 16 Sep 2024 21:38 )             42.2     09-16    138  |  102  |  18  ----------------------------<  81  4.4   |  31  |  1.02    Ca    9.5      16 Sep 2024 21:38        Urinalysis Basic - ( 16 Sep 2024 21:38 )    Color: x / Appearance: x / SG: x / pH: x  Gluc: 81 mg/dL / Ketone: x  / Bili: x / Urobili: x   Blood: x / Protein: x / Nitrite: x   Leuk Esterase: x / RBC: x / WBC x   Sq Epi: x / Non Sq Epi: x / Bacteria: x        RADIOLOGY & ADDITIONAL STUDIES:     CHIEF COMPLAINT/ REASON FOR CONSULTATION: left headache and posterior eye pain a/w vision changes and balance issues      HPI: From ED Provider note 9/17   "63 yr old male, history of AFib (on metoprolol and eliquis), presents to the Emergency Department w headache. pt sp head injury two months ago. tripped and fell after feeling lightheaded in setting of run of afib. notes he has these episodes at times and follows closely w cards. was not seen following episode. +heasdtrike, no loc. since this injury has had intermittent headaches, dizziness. generalized headaches that last a few days at a time. pressure behind eyes. sensation of room spinning dizziness on occasion, worse w movement of head / position changes when he is having it. headaches more frequent and more severe so came for eval. no new trauma. no infectious symptoms. no neck pain, vision changes, vomiting, extremity weakness / numbness, speech or gait changes."     Neurosurgery Consult Note:   64yo M PMHx afib (on Eliquis last dose 5mg 9/16 am, pend ablation scheduled at [facility] October), recent fall in July of this year with left headstrike from standing height presents to [facility] ED c/o left-sided headache a/w some blurry vision in left > right eye, left shoulder pain, and feeling "like my brain is not working" x ~2 mos. Pt reports he intermittently feels lightheaded which he usually attributes to his episodes of palpitations due to his afib. Pt reports only falling and hitting his head once in July after he was experiencing lightheadedness and fell and hit the left side of his head and his left shoulder. Denies LOC or seizure activity. Since this fall, pt has noticed intermittent left headaches a/w left posterior eye pain, left ear "feeling wet without any discharge', and persistent left shoulder pain. The left-sided headache is a consistent aching mostly behind the left eye, denies worsening with change in positioning and/or dizziness. [place] often feels unsteady on his feet requiring him to hold onto things especially when going up and down stairs. Pt's afib is being managed medically, reports intermittent heart palpitations and associated episodes of lightheadedness. CTH performed in ED read as small right posterior inferior parietal lobe hyperdensity possible IPH vs meningioma. CTA head and neck negative for acute pathology. Denies h/o stroke, prior brain surgery, vertigo, weakness, seizures, N/V, fever, recent travel.           PAST MEDICAL HISTORY   HTN (hypertension)    Nephrolithiasis    Anxiety    Atrial fibrillation      PAST SURGICAL HISTORY   H/O lithotripsy      doxycycline (Hives)      MEDICATIONS:  Antibiotics:    Neuro:    Anticoagulation:    Other:        FAMILY HISTORY:  Family history of CVA (Father)        REVIEW OF SYSTEMS:  Check here if all are normal other than Neurological [X]  General:  Eyes:  ENT:  Cardiac:  Respiratory:  GI:  Musculoskeletal:   Skin:  Neurologic:   Psychiatric:     PHYSICAL EXAMINATION:   T(C): 36.7 (09-16-24 @ 20:36), Max: 36.7 (09-16-24 @ 20:36)  HR: 78 (09-16-24 @ 20:36) (78 - 78)  BP: 135/95 (09-16-24 @ 20:36) (135/95 - 135/95)  RR: 16 (09-16-24 @ 20:36) (16 - 16)  SpO2: 99% (09-16-24 @ 20:36) (99% - 99%)  Wt(kg): --Height (cm): 182.9 (09-16 @ 20:36)  Weight (kg): 72.6 (09-16 @ 20:36)    EXAM  General: NAD, pt is comfortably sitting up in bed, A&O x3, on RA  HEENT: CN II-XII grossly intact, PERRL 3mm, EOMI b/l, face symmetric, tongue midline, neck FROM  Cardiovascular: RRR, normal S1 and S2   Respiratory: lungs CTAB, no wheezing, rhonchi, or crackles   GI: normoactive BS to auscultation, abd soft, NTND   Neuro: no aphasia, speech clear, no dysmetria, no pronator drift  strength 5/5 throughout all 4 extremities  sensation intact to light touch throughout   Extremities: distal pulses 2+ x4         LABS:                        14.0   4.01  )-----------( 186      ( 16 Sep 2024 21:38 )             42.2     09-16    138  |  102  |  18  ----------------------------<  81  4.4   |  31  |  1.02    Ca    9.5      16 Sep 2024 21:38        Urinalysis Basic - ( 16 Sep 2024 21:38 )    Color: x / Appearance: x / SG: x / pH: x  Gluc: 81 mg/dL / Ketone: x  / Bili: x / Urobili: x   Blood: x / Protein: x / Nitrite: x   Leuk Esterase: x / RBC: x / WBC x   Sq Epi: x / Non Sq Epi: x / Bacteria: x        RADIOLOGY & ADDITIONAL STUDIES:  < from: CT Head No Cont (09.17.24 @ 00:36) >  IMPRESSION:    Rounded hyperattenuating focus in the posterior and inferior right   parietal lobe may represent acute parenchymal hemorrhage. Lesion is also   located adjacent to the tentorium and could represent a meningioma.   Recommend attention on close interval follow-up.    Dr. Higgins discussed theabove findings with LISSETTE Obregon on 9/17/2024   at 12:33 AM with read back confirmation.    --- End of Report ---        < from: CT Angio Head w/ IV Cont (09.17.24 @ 00:36) >  IMPRESSION:    Intracranial CTA: No high-grade steno-occlusive disease. No no aneurysm.   No evidence of AVM.    Extracranial CTA: No high-grade steno-occlusive disease. No dissection.    Filling defect in the left upper lobe segmental pulmonary artery is   favored to represent contrast mixing artifact; however, pulmonary   embolism cannot be excluded. Recommend further assessment with CTA of the   chest.

## 2024-09-17 NOTE — ED PROVIDER NOTE - CLINICAL SUMMARY MEDICAL DECISION MAKING FREE TEXT BOX
headache - possible post concussive vs unrelated tension ha vs migraine. r/o ich although low suspicion. r/o mass.   dizziness - seemingly peripheral based on description and has reassuring exam. but will get cta r/o occlusion vs stenosis.   doubt stroke based on length of symptoms.  assess for anemia, electrolyte derangement, dehydration. history of AFib (on metoprolol and eliquis), w intermittent headaches after fall w head strike 2 months ago. sensation of room spinning dizziness on occasion, worse w movement of head / position changes when he is having it. headaches more frequent and more severe so came for eval. no new trauma.   pt well appearing, stable vitals, exam unremarkable  headache - possible post concussive vs unrelated tension ha vs migraine. r/o ich although low suspicion. r/o mass.   dizziness - seemingly peripheral based on description and has reassuring exam. but will get cta r/o occlusion vs stenosis.   doubt stroke based on length of symptoms.  assess for anemia, electrolyte derangement, dehydration.  plan - labs, cth  treat headache symptomatically

## 2024-09-17 NOTE — ED PROVIDER NOTE - PATIENT PORTAL LINK FT
You can access the FollowMyHealth Patient Portal offered by [facility] by registering at the following website: [email or web address]. By joining [employer]’s FollowMyHealth portal, you will also be able to view your health information using other applications (apps) compatible with our system.

## 2024-09-17 NOTE — ED PROVIDER NOTE - PROGRESS NOTE DETAILS
labs unremarkable.   CT labs unremarkable.   CT w/o acute findings.  pt feels well. stable for dc. f/u w dr damico    All results reviewed with the patient verbally. Discharge plan and return precautions d/w pt who verbalized understanding and agrees with plan. All questions answered. Vitals WNL. Ready for d/c.

## 2024-09-17 NOTE — ED PROVIDER NOTE - NSFOLLOWUPINSTRUCTIONS_ED_ALL_ED_FT
Follow up with Dr Damico for further evaluation (MRI) and management.    Continue all medications as previously prescribed    Take tylenol / motrin for headache    Take meclizine as prescribed for dizziness.      Return to the Emergency Department for persistent, worsening or new symptoms including, sudden severe headache, change in vision, syncope or loss of consciousness, a facial droop, weakness of one side of your body or one of your extremities, uncontrollable nausea and vomiting, chest pain, shortness of breath, if you cannot keep down food/liquid, if your fever is very high and cannot be controlled by over the counter medication, if you pass out or lose consciousness, feel palpitations, chest pain, or shortness of breath, or if you have any other serious concerns.

## 2024-09-17 NOTE — ED PROVIDER NOTE - CARE PROVIDER_API CALL
D'Amico, Randy Scott  Neurosurgery  [address] [address]  Phone: [phone]  Fax: [phone]  Follow Up Time:

## 2024-09-17 NOTE — CONSULT NOTE ADULT - ASSESSMENT
2yo M PMHx afib (on Eliquis last dose 5mg 9/16 am, pend ablation scheduled at [facility] October), recent fall in July of this year with left headstrike from standing height presents to [facility] ED c/o left-sided headache a/w some blurry vision in left > right eye, left shoulder pain, and feeling "like my brain is not working" x ~2 mos. Presented to [facility] ED for more frequent and persistent left headaches a/w left posterior eye pain, left ear "feeling wet without any discharge" and persistent left shoulder pain. CTH in ED read as small right posterior inferior parietal lobe hyperdensity possible IPH vs meningioma.     PLAN:   - pend stability CTH 4-6hrs, f/u final read  2yo M PMHx afib (on Eliquis last dose 5mg 9/16 am, pend ablation scheduled at [facility] October), recent fall in July of this year with left headstrike from standing height presents to [facility] ED c/o left-sided headache a/w some blurry vision in left > right eye, left shoulder pain, and feeling "like my brain is not working" x ~2 mos. Presented to [facility] ED for more frequent and persistent left headaches a/w left posterior eye pain, left ear "feeling wet without any discharge" and persistent left shoulder pain. CTH in ED read as small right posterior inferior parietal lobe hyperdensity possible IPH vs meningioma.     PLAN:   - case and imaging discussed with Dr. D'Amico   - repeat CTH stability: no change in 7mm hyperdensity within right posterior inferior parietal lobe, findings consistent w/ small IPH vs meningioma   - no Neurosurgical intervention indicated at this time   - recommend MRI brain w/ and w/o IV contrast (non-urgent)   - rest of care as per ED   - patient can follow-up with Dr. D'Amico as an outpatient for MRI     Dr. D'Amico   [address]   [address] [place] [address], [place] [address]   Office #[phone]

## 2024-09-17 NOTE — ED PROVIDER NOTE - PHYSICAL EXAMINATION
Constitutional : Well appearing, non-toxic, no acute distress. awake, alert, oriented to person, place, time/situation.  Head : head normocephalic, atraumatic  EENMT : eyes clear bilaterally, PERRL, EOMI. airway patent. moist mucous membranes. neck supple.  Cardiac : Normal rate, regular rhythm. No murmur appreciated, no LE edema.  Resp : Breath sounds clear and equal bilaterally. Respirations even and unlabored.   Gastro : abdomen soft, nontender, nondistended. no rebound or guarding. no CVAT.  MSK :  range of motion is not limited, no muscle or joint tenderness  Back : No evidence of trauma. No spinal or CVA tenderness.  Vasc : Extremities warm and well perfused. 2+ radial and DP pulses. cap refill <2 seconds  Neuro : A&Ox3, CNII-XII grossly intact. visual fields intact, no nystagmus, normal speech and word-finding. 5/5 motor in UE and LE, sensation intact throughout. ambulating with a steady gait, normal tandem gait. normal finger to nose. negative Romberg, no pronator drift   Skin : Skin normal color for race, warm, dry and intact.   Psych : Alert and oriented to person, place, time/situation. normal mood and affect. no apparent risk to self or others.

## 2024-09-20 DIAGNOSIS — I48.91 UNSPECIFIED ATRIAL FIBRILLATION: ICD-10-CM
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
## 2024-09-20 DIAGNOSIS — R51.9 HEADACHE, UNSPECIFIED: ICD-10-CM

## 2024-09-20 DIAGNOSIS — Z79.01 LONG TERM (CURRENT) USE OF ANTICOAGULANTS: ICD-10-CM

## 2024-09-20 DIAGNOSIS — Z88.1 ALLERGY STATUS TO OTHER ANTIBIOTIC AGENTS: ICD-10-CM

## 2024-10-01 PROBLEM — G93.9 BRAIN LESION: Status: ACTIVE | Noted: 2024-10-01

## 2024-10-02 ENCOUNTER — APPOINTMENT (OUTPATIENT)
Dept: NEUROSURGERY | Facility: CLINIC | Age: 64
End: 2024-10-02
Payer: MEDICARE

## 2024-10-02 ENCOUNTER — APPOINTMENT (OUTPATIENT)
Dept: MRI IMAGING | Facility: HOSPITAL | Age: 64
End: 2024-10-02

## 2024-10-02 ENCOUNTER — OUTPATIENT (OUTPATIENT)
Dept: OUTPATIENT SERVICES | Facility: HOSPITAL | Age: 64
LOS: 1 days | End: 2024-10-02
Payer: MEDICARE

## 2024-10-02 VITALS
HEIGHT: 72 IN | OXYGEN SATURATION: 98 % | DIASTOLIC BLOOD PRESSURE: 88 MMHG | RESPIRATION RATE: 18 BRPM | HEART RATE: 68 BPM | WEIGHT: 160 LBS | TEMPERATURE: 97.6 F | BODY MASS INDEX: 21.67 KG/M2 | SYSTOLIC BLOOD PRESSURE: 149 MMHG

## 2024-10-02 DIAGNOSIS — N20.0 CALCULUS OF KIDNEY: ICD-10-CM

## 2024-10-02 DIAGNOSIS — Z98.890 OTHER SPECIFIED POSTPROCEDURAL STATES: Chronic | ICD-10-CM

## 2024-10-02 DIAGNOSIS — D32.9 BENIGN NEOPLASM OF MENINGES, UNSPECIFIED: ICD-10-CM

## 2024-10-02 DIAGNOSIS — G93.9 DISORDER OF BRAIN, UNSPECIFIED: ICD-10-CM

## 2024-10-02 DIAGNOSIS — D18.00 HEMANGIOMA UNSPECIFIED SITE: ICD-10-CM

## 2024-10-02 PROCEDURE — 99204 OFFICE O/P NEW MOD 45 MIN: CPT

## 2024-10-02 PROCEDURE — 70553 MRI BRAIN STEM W/O & W/DYE: CPT | Mod: 26,MH

## 2024-10-02 NOTE — HISTORY OF PRESENT ILLNESS
[FreeTextEntry1] : 64 y/o Male with a PMHx of Afib (on Eliquis), fall in July 2024 with head strike from standing height, who was recently in [facility] ED 9/16/24 with c/o left-sided headache a/w some blurry vision in left > right eye, left shoulder pain, and feeling "like my brain is not working" x ~2 mos. CTH in ED read as small right posterior inferior parietal lobe hyperdensity possible IPH vs meningioma that was stable of repeat scan. CTA head/neck negative for acute pathology. Pt discharged with outpatient follow- up.  Presents today for evaluation/ review of MRI brain w/wo contrast that he completed today. He continues to have left sided headaches and left blurred vision and feeling a bit off, describes feeling as "raining inside brain."  Denies weakness, seizure like activities.  Pending ablation for Afib next week, has intermittent heart palpitations and associated episodes of lightheadedness.   Cards: Socorro Young

## 2024-10-02 NOTE — DATA REVIEWED
[de-identified] : ACC: [id number] EXAM: CT BRAIN ORDERED BY: JINA GAURANG  PROCEDURE DATE: 09/17/2024    INTERPRETATION: IApril MD, have reviewed the images and the report and agree with the findings with the additional modification: Approximately 7 mm hyperdensity within the right posterior inferior temporal lobe, unchanged from CT of the head performed on 9/16/2024 however is not well seen on the CTA performed on 9/16/2024; if there is further clinical concern would recommend MRI for further characterization..   INDICATIONS: stability scan  TECHNIQUE: Serial axial images were obtained from the skull base to the vertex without the use of intravenous contrast.  COMPARISON EXAMINATION: CT head 9/16/2024  FINDINGS:  No interval change in a 7 mm rounded hyperattenuating focus in the right inferior posterior parietal lobe (2:12). No new areas of hemorrhage. No significant mass effect or midline shift. No hydrocephalus. No acute territorial infarct. Paranasal sinuses and mastoids well aerated. Calvarium is intact without acute fracture.  IMPRESSION:  No change in 7 mm rounded hyperattenuating focus in the right inferior posterior parietal lobe. This again may represent a small focus of hemorrhage versus meningioma [de-identified] :  ACC: [id number] EXAM: CT ANGIO NECK (W)AW IC ORDERED BY: JINA MERLOS  ACC: [id number] EXAM: CT ANGIO BRAIN (W)AW IC ORDERED BY: JINA MERLOS  PROCEDURE DATE: 09/17/2024    INTERPRETATION: PROCEDURES: CTA brain with intravenous contrast. CTA neck with intravenous contrast.  INDICATION: Headache. Vision changes.  TECHNIQUE: Multiple axial thin section were obtained from the aortic arch through the neck and intracranial compartment up to the calvarial vertex. Imaging is done after the IV bolus of 80 cc Isovue 370. MIP series are provided.  COMPARISON: None  FINDINGS:  INTRACRANIAL: The internal carotid arteries are patent at the skull base and intracranial compartment without occlusion or high grade stenosis. The anterior and middle cerebral arteries are patent at their 1st and 2nd order segments, and appear symmetric caliber. The posterior circulation shows no high grade stenosis or occlusion. The intracranial vertebral arteries, the basilar artery and both posterior cerebral arteries are patent. There is no site of aneurysm within the resolution limitations of CTA.  EXTRACRANIAL:  The aortic arch is normal size and shows patency, with standard 3 vessel configuration. No significant great vessel stenosis.  Both common carotid arteries are patent to the bifurcations.  Left internal carotid artery is patent at the bifurcation without hemodynamic significant stenosis, and otherwise widely patent up to the skull base without stenosis or dissection. Right internal carotid artery is patent at the bifurcation without hemodynamic significant stenosis, and otherwise widely patent up to the skull base without stenosis or dissection.  Vertebral arteries are patent at their origins and throughout their course in the neck. Small fenestration is noted at the origin of the basilar artery.  Apparent filling defect in a left upper lobe segmental pulmonary artery (10:947) is favored to represent contrast mixing artifact.  IMPRESSION:  Intracranial CTA: No high-grade steno-occlusive disease. No no aneurysm. No evidence of AVM.  Extracranial CTA: No high-grade steno-occlusive disease. No dissection.  Filling defect in the left upper lobe segmental pulmonary artery is favored to represent contrast mixing artifact; however, pulmonary embolism cannot be excluded. Recommend further assessment with CTA of the chest.  [de-identified] : MRI brain w/wo done today, pending final radiology read

## 2024-10-02 NOTE — REVIEW OF SYSTEMS
[As Noted in HPI] : as noted in HPI [As noted in HPI] : as noted in HPI [Fever] : no fever [Chills] : no chills

## 2024-10-02 NOTE — PHYSICAL EXAM
[General Appearance - Alert] : alert [General Appearance - In No Acute Distress] : in no acute distress [Oriented To Time, Place, And Person] : oriented to person, place, and time [Impaired Insight] : insight and judgment were intact [Affect] : the affect was normal [Memory Recent] : recent memory was not impaired [Sclera] : the sclera and conjunctiva were normal [Neck Appearance] : the appearance of the neck was normal [] : no respiratory distress [Respiration, Rhythm And Depth] : normal respiratory rhythm and effort [Abnormal Walk] : normal gait [Skin Color & Pigmentation] : normal skin color and pigmentation

## 2024-10-08 ENCOUNTER — INPATIENT (INPATIENT)
Facility: HOSPITAL | Age: 64
LOS: 0 days | Discharge: HOME | DRG: 274 | End: 2024-10-09
Attending: INTERNAL MEDICINE | Admitting: INTERNAL MEDICINE
Payer: MEDICARE

## 2024-10-08 VITALS
SYSTOLIC BLOOD PRESSURE: 118 MMHG | HEART RATE: 63 BPM | RESPIRATION RATE: 18 BRPM | HEIGHT: 72 IN | OXYGEN SATURATION: 97 % | TEMPERATURE: 97 F | DIASTOLIC BLOOD PRESSURE: 79 MMHG

## 2024-10-08 DIAGNOSIS — Z98.890 OTHER SPECIFIED POSTPROCEDURAL STATES: Chronic | ICD-10-CM

## 2024-10-08 LAB
ALBUMIN SERPL ELPH-MCNC: 3.6 G/DL — SIGNIFICANT CHANGE UP (ref 3.3–5)
ALBUMIN SERPL ELPH-MCNC: 4.2 G/DL — SIGNIFICANT CHANGE UP (ref 3.3–5)
ALP SERPL-CCNC: 58 U/L — SIGNIFICANT CHANGE UP (ref 40–120)
ALP SERPL-CCNC: 73 U/L — SIGNIFICANT CHANGE UP (ref 40–120)
ALT FLD-CCNC: 11 U/L — SIGNIFICANT CHANGE UP (ref 10–45)
ALT FLD-CCNC: 13 U/L — SIGNIFICANT CHANGE UP (ref 10–45)
ANION GAP SERPL CALC-SCNC: 8 MMOL/L — SIGNIFICANT CHANGE UP (ref 5–17)
ANION GAP SERPL CALC-SCNC: 8 MMOL/L — SIGNIFICANT CHANGE UP (ref 5–17)
APTT BLD: 37.7 SEC — HIGH (ref 24.5–35.6)
AST SERPL-CCNC: 14 U/L — SIGNIFICANT CHANGE UP (ref 10–40)
AST SERPL-CCNC: 18 U/L — SIGNIFICANT CHANGE UP (ref 10–40)
BILIRUB SERPL-MCNC: 0.4 MG/DL — SIGNIFICANT CHANGE UP (ref 0.2–1.2)
BILIRUB SERPL-MCNC: 0.6 MG/DL — SIGNIFICANT CHANGE UP (ref 0.2–1.2)
BLD GP AB SCN SERPL QL: NEGATIVE — SIGNIFICANT CHANGE UP
BUN SERPL-MCNC: 16 MG/DL — SIGNIFICANT CHANGE UP (ref 7–23)
BUN SERPL-MCNC: 20 MG/DL — SIGNIFICANT CHANGE UP (ref 7–23)
CALCIUM SERPL-MCNC: 8.4 MG/DL — SIGNIFICANT CHANGE UP (ref 8.4–10.5)
CALCIUM SERPL-MCNC: 9.4 MG/DL — SIGNIFICANT CHANGE UP (ref 8.4–10.5)
CHLORIDE SERPL-SCNC: 105 MMOL/L — SIGNIFICANT CHANGE UP (ref 96–108)
CHLORIDE SERPL-SCNC: 106 MMOL/L — SIGNIFICANT CHANGE UP (ref 96–108)
CO2 SERPL-SCNC: 27 MMOL/L — SIGNIFICANT CHANGE UP (ref 22–31)
CO2 SERPL-SCNC: 30 MMOL/L — SIGNIFICANT CHANGE UP (ref 22–31)
CREAT SERPL-MCNC: 1.03 MG/DL — SIGNIFICANT CHANGE UP (ref 0.5–1.3)
CREAT SERPL-MCNC: 1.22 MG/DL — SIGNIFICANT CHANGE UP (ref 0.5–1.3)
EGFR: 66 ML/MIN/1.73M2 — SIGNIFICANT CHANGE UP
EGFR: 81 ML/MIN/1.73M2 — SIGNIFICANT CHANGE UP
GLUCOSE SERPL-MCNC: 112 MG/DL — HIGH (ref 70–99)
GLUCOSE SERPL-MCNC: 94 MG/DL — SIGNIFICANT CHANGE UP (ref 70–99)
HAPTOGLOB SERPL-MCNC: 60 MG/DL — SIGNIFICANT CHANGE UP (ref 34–200)
HAPTOGLOB SERPL-MCNC: SIGNIFICANT CHANGE UP (ref 34–200)
HCT VFR BLD CALC: 35.3 % — LOW (ref 39–50)
HCT VFR BLD CALC: 41 % — SIGNIFICANT CHANGE UP (ref 39–50)
HGB BLD-MCNC: 11.8 G/DL — LOW (ref 13–17)
HGB BLD-MCNC: 13.7 G/DL — SIGNIFICANT CHANGE UP (ref 13–17)
INR BLD: 1.34 — HIGH (ref 0.85–1.16)
ISTAT INR: 1.2 — HIGH (ref 0.88–1.16)
ISTAT PT: 12.1 SEC — SIGNIFICANT CHANGE UP (ref 10–12.9)
ISTAT VENOUS BE: 4 MMOL/L — HIGH (ref -2–3)
ISTAT VENOUS GLUCOSE: 92 MG/DL — SIGNIFICANT CHANGE UP (ref 70–99)
ISTAT VENOUS HCO3: 31 MMOL/L — HIGH (ref 23–28)
ISTAT VENOUS HEMATOCRIT: 42 % — SIGNIFICANT CHANGE UP (ref 39–50)
ISTAT VENOUS HEMOGLOBIN: 14.3 GM/DL — SIGNIFICANT CHANGE UP (ref 13–17)
ISTAT VENOUS IONIZED CALCIUM: 1.24 MMOL/L — SIGNIFICANT CHANGE UP (ref 1.12–1.3)
ISTAT VENOUS PCO2: 57 MMHG — HIGH (ref 41–51)
ISTAT VENOUS PH: 7.35 — SIGNIFICANT CHANGE UP (ref 7.31–7.41)
ISTAT VENOUS PO2: <66 MMHG — LOW (ref 35–40)
ISTAT VENOUS POTASSIUM: 4.2 MMOL/L — SIGNIFICANT CHANGE UP (ref 3.5–5.3)
ISTAT VENOUS SO2: 32 % — SIGNIFICANT CHANGE UP
ISTAT VENOUS SODIUM: 140 MMOL/L — SIGNIFICANT CHANGE UP (ref 135–145)
ISTAT VENOUS TCO2: 33 MMOL/L — HIGH (ref 22–31)
LDH SERPL L TO P-CCNC: 174 U/L — SIGNIFICANT CHANGE UP (ref 50–242)
LDH SERPL L TO P-CCNC: 209 U/L — SIGNIFICANT CHANGE UP (ref 50–242)
MCHC RBC-ENTMCNC: 30.3 PG — SIGNIFICANT CHANGE UP (ref 27–34)
MCHC RBC-ENTMCNC: 30.6 PG — SIGNIFICANT CHANGE UP (ref 27–34)
MCHC RBC-ENTMCNC: 33.4 GM/DL — SIGNIFICANT CHANGE UP (ref 32–36)
MCHC RBC-ENTMCNC: 33.4 GM/DL — SIGNIFICANT CHANGE UP (ref 32–36)
MCV RBC AUTO: 90.7 FL — SIGNIFICANT CHANGE UP (ref 80–100)
MCV RBC AUTO: 91.7 FL — SIGNIFICANT CHANGE UP (ref 80–100)
NRBC # BLD: 0 /100 WBCS — SIGNIFICANT CHANGE UP (ref 0–0)
NRBC # BLD: 0 /100 WBCS — SIGNIFICANT CHANGE UP (ref 0–0)
PLATELET # BLD AUTO: 180 K/UL — SIGNIFICANT CHANGE UP (ref 150–400)
PLATELET # BLD AUTO: 185 K/UL — SIGNIFICANT CHANGE UP (ref 150–400)
POCT ISTAT CREATININE: 1.2 MG/DL — SIGNIFICANT CHANGE UP (ref 0.5–1.3)
POTASSIUM SERPL-MCNC: 4.2 MMOL/L — SIGNIFICANT CHANGE UP (ref 3.5–5.3)
POTASSIUM SERPL-MCNC: 4.4 MMOL/L — SIGNIFICANT CHANGE UP (ref 3.5–5.3)
POTASSIUM SERPL-SCNC: 4.2 MMOL/L — SIGNIFICANT CHANGE UP (ref 3.5–5.3)
POTASSIUM SERPL-SCNC: 4.4 MMOL/L — SIGNIFICANT CHANGE UP (ref 3.5–5.3)
PROT SERPL-MCNC: 5.7 G/DL — LOW (ref 6–8.3)
PROT SERPL-MCNC: 6.9 G/DL — SIGNIFICANT CHANGE UP (ref 6–8.3)
PROTHROM AB SERPL-ACNC: 15.6 SEC — HIGH (ref 9.9–13.4)
RBC # BLD: 3.89 M/UL — LOW (ref 4.2–5.8)
RBC # BLD: 4.47 M/UL — SIGNIFICANT CHANGE UP (ref 4.2–5.8)
RBC # FLD: 12.3 % — SIGNIFICANT CHANGE UP (ref 10.3–14.5)
RBC # FLD: 12.3 % — SIGNIFICANT CHANGE UP (ref 10.3–14.5)
RH IG SCN BLD-IMP: POSITIVE — SIGNIFICANT CHANGE UP
SODIUM SERPL-SCNC: 141 MMOL/L — SIGNIFICANT CHANGE UP (ref 135–145)
SODIUM SERPL-SCNC: 143 MMOL/L — SIGNIFICANT CHANGE UP (ref 135–145)
WBC # BLD: 6.27 K/UL — SIGNIFICANT CHANGE UP (ref 3.8–10.5)
WBC # BLD: 6.96 K/UL — SIGNIFICANT CHANGE UP (ref 3.8–10.5)
WBC # FLD AUTO: 6.27 K/UL — SIGNIFICANT CHANGE UP (ref 3.8–10.5)
WBC # FLD AUTO: 6.96 K/UL — SIGNIFICANT CHANGE UP (ref 3.8–10.5)

## 2024-10-08 PROCEDURE — 93656 COMPRE EP EVAL ABLTJ ATR FIB: CPT

## 2024-10-08 RX ORDER — ACETAMINOPHEN 325 MG
650 TABLET ORAL ONCE
Refills: 0 | Status: COMPLETED | OUTPATIENT
Start: 2024-10-08 | End: 2024-10-08

## 2024-10-08 RX ORDER — DILTIAZEM HCL 300 MG
120 CAPSULE, EXTENDED RELEASE 24HR ORAL DAILY
Refills: 0 | Status: DISCONTINUED | OUTPATIENT
Start: 2024-10-08 | End: 2024-10-09

## 2024-10-08 RX ORDER — APIXABAN 5 MG/1
5 TABLET, FILM COATED ORAL
Refills: 0 | Status: DISCONTINUED | OUTPATIENT
Start: 2024-10-08 | End: 2024-10-09

## 2024-10-08 RX ORDER — DILTIAZEM HCL 300 MG
1 CAPSULE, EXTENDED RELEASE 24HR ORAL
Refills: 0 | DISCHARGE

## 2024-10-08 RX ORDER — INFLUENZA VIRUS VACCINE 15; 15; 15; 15 UG/.5ML; UG/.5ML; UG/.5ML; UG/.5ML
0.5 SUSPENSION INTRAMUSCULAR ONCE
Refills: 0 | Status: DISCONTINUED | OUTPATIENT
Start: 2024-10-08 | End: 2024-10-09

## 2024-10-08 RX ADMIN — Medication 100 MILLIGRAM(S): at 21:51

## 2024-10-08 RX ADMIN — APIXABAN 5 MILLIGRAM(S): 5 TABLET, FILM COATED ORAL at 20:00

## 2024-10-08 RX ADMIN — Medication 650 MILLIGRAM(S): at 22:51

## 2024-10-08 RX ADMIN — Medication 650 MILLIGRAM(S): at 21:51

## 2024-10-08 NOTE — PRE-ANESTHESIA EVALUATION ADULT - LAST ECHOCARDIOGRAM
Chief Complaint   Patient presents with   • Stroke Alert     Aphasia    History Of Present Illness  Angelica is a 87 year old female presenting with aphasia.  Patient currently is sleeping, has been confused and agitated since admission according to IV, he received multiple doses of lorazepam in the emergency room and continuing confused and out of bed and agitated.  She received a dose of IV Haldol 5 mg about 4 hours ago, she is currently finally sleeping.  She does not wake up with calling and gentle attempt to wake her up.  I decided at this time to let her sleep to get some rest.  I attempted to call her daughter, unfortunately the phone number there is listed has been disconnected.  History obtained from the ER physician,s note.  Apparently on the morning of admission patient was playing bridge with a friends and was noted to have difficulty with his speech.  Paramedics were called patient was brought to the emergency room at which time she remained aphasic.  Neurologic exam in the ER did not reveal any focal neurologic abnormalities.  CT of the head showed no acute changes.  CTA head and neck did not reveal any severe stenosis.  However both exams are somewhat limited due to motion abnormalities.  MRI under anesthesia being planned.  Her CBC and blood chemistries were unremarkable except for mild hyperglycemia, minimally elevated BUN and hypoalbuminemia.  EKG showed no acute changes, troponin negative    t  Review of Systems   unable to at this time    Past Medical History    She had history of hypertension blood pressure has been controlled.  She used to be on nifedipine which was put on hold for the past couple months since allergist suggested that patients severe itch and rash may be secondary to nifedipine.  However that did not make any difference.  She was put on hydralazine in the interim while nifedipine was held.    She has been having severe pruritic skin rash of unknown etiology, was evaluated by  dermatologist and allergist, allergy skin test was negative.  She was advised by allergist to hold simvastatin and nifedipine which did not make any difference.    She has history of diabetes mellitus type 2 unable to tolerate metformin, she was put on Januvia however that was too expensive and subsequently put on glipizide.  Blood sugars been reasonably well controlled.  No history of diabetic retinopathy, peripheral neuropathy, or peripheral arterial disease.    History of obesity NELY unable to tolerate CPAP    She has history of choroid plexus papilloma for many years, her last MRI of the brain was on May 30, 2013 which showed minimal changes remain asymptomatic.    History of GERD which is been well controlled with omeprazole no reflux symptoms for some time.  No dysphagia in the past.  Past Medical History:   Diagnosis Date   • Choroid plexus papilloma (CMS/HCC)    • Diabetes mellitus (CMS/HCC)    • Essential (primary) hypertension    • High cholesterol    • Pruritus 8/9/2019   • Sleep apnea         Surgical History  Past Surgical History:   Procedure Laterality Date   • Appendectomy     • Eye surgery     • Knee arthroplasty Bilateral         Social History  Social History     Tobacco Use   • Smoking status: Never Smoker   • Smokeless tobacco: Never Used   Substance Use Topics   • Alcohol use: Never     Frequency: Never   • Drug use: Never       Family History  Family History   Problem Relation Age of Onset   • Cancer Mother         Allergies  ALLERGIES:  Metformin    Medications  Medications Prior to Admission   Medication Sig Dispense Refill   • loratadine (CLARITIN) 10 MG tablet Take 10 mg by mouth daily.     • metoPROLOL tartrate (LOPRESSOR) 50 MG tablet TAKE 1 TABLET EVERY DAY 90 tablet 1   • losartan-hydrochlorothiazide (HYZAAR) 100-12.5 MG per tablet TAKE 1 TABLET EVERY DAY (NEED MD APPOINTMENT) 90 tablet 1   • hydrALAZINE (APRESOLINE) 25 MG tablet TAKE 1 TABLET TWICE DAILY (Patient taking differently:  Take 50 mg by mouth 3 times daily. TAKE 1 TABLET TWICE DAILY) 180 tablet 1   • tramadol-acetaminophen (ULTRACET) 37.5-325 MG per tablet Take 1 tablet by mouth 3 times daily as needed for Pain. 90 tablet 0   • glipiZIDE (GLUCOTROL) 2.5 MG 24 hr tablet Take 1 tablet by mouth daily. 90 tablet 3   • betamethasone valerate (VALISONE) 0.1 % cream Apply topically 2 times daily. 60 g 0   • ACCU-CHEK WENDI test strip Test blood sugar 2 times daily as directed. Diagnosis: E11.9. Meter: AxtriaVA 200 strip 1   • SOFTCLIX LANCETS Misc 2 times daily. TEST BS TWICE DAILY 200 each 1   • Blood Glucose Monitoring Suppl (ACCU-CHEK WENDI) Device TEST BS TWICE DAILY 1 Device 0        Last Recorded Vitals  Blood pressure 148/62, pulse 64, temperature 97.5 °F (36.4 °C), resp. rate 16, height 5' 4\" (1.626 m), weight 78.8 kg (173 lb 11.6 oz), SpO2 97 %.    Physical Exam   patient resting sleeping  Blood pressure 148/62 heart rate 64/min regular  Thyroid gland nonenlarged no palpable lymph nodes no carotid bruit  Heart: Normal S1-S2 no murmurs no gallops  Normal breath sounds no rales or rhonchi  Abdomen is not distended liver spleen not palpable bowel sounds active  No edema  Neurologic exam cannot be performed    Imaging      Labs   Recent Results (from the past 48 hour(s))   CBC with Automated Differential    Collection Time: 11/06/19 12:50 PM   Result Value Ref Range    WBC 10.6 4.2 - 11.0 K/mcL    RBC 4.68 4.00 - 5.20 mil/mcL    HGB 14.1 12.0 - 15.5 g/dL    HCT 43.9 36.0 - 46.5 %    MCV 93.8 78.0 - 100.0 fl    MCH 30.1 26.0 - 34.0 pg    MCHC 32.1 32.0 - 36.5 g/dL    RDW-CV 14.8 11.0 - 15.0 %     140 - 450 K/mcL    NRBC 0 0 /100 WBC    DIFF TYPE AUTOMATED DIFFERENTIAL     Neutrophil 50 %    LYMPH 43 %    MONO 6 %    EOSIN 1 %    BASO 0 %    Percent Immature Granuloctyes 0 %    Absolute Neutrophil 5.2 1.8 - 7.7 K/mcL    Absolute Lymph 4.6 (H) 1.0 - 4.0 K/mcL    Absolute Mono 0.6 0.3 - 0.9 K/mcL    Absolute Eos 0.1 0.1 - 0.5  K/mcL    Absolute Baso 0.0 0.0 - 0.3 K/mcL    Absolute Immature Granulocytes 0.0 0 - 0.2 K/mcl   COMPREHENSIVE METABOLIC PANEL    Collection Time: 11/06/19 12:50 PM   Result Value Ref Range    Sodium 140 135 - 145 mmol/L    Potassium 3.9 3.4 - 5.1 mmol/L    Chloride 104 98 - 107 mmol/L    Carbon Dioxide 27 21 - 32 mmol/L    Anion Gap 13 10 - 20 mmol/L    Glucose 164 (H) 65 - 99 mg/dL    BUN 24 (H) 6 - 20 mg/dL    Creatinine 0.71 0.51 - 0.95 mg/dL    GFR Estimate,  88     GFR Estimate, Non African American 76     BUN/Creatinine Ratio 34 (H) 7 - 25    CALCIUM 9.1 8.4 - 10.2 mg/dL    TOTAL BILIRUBIN 0.3 0.2 - 1.0 mg/dL    AST/SGOT 24 <38 Units/L    ALT/SGPT 16 <64 Units/L    ALK PHOSPHATASE 69 45 - 117 Units/L    TOTAL PROTEIN 7.8 6.4 - 8.2 g/dL    Albumin 2.9 (L) 3.6 - 5.1 g/dL    GLOBULIN 4.9 (H) 2.0 - 4.0 g/dL    A/G Ratio, Serum 0.6 (L) 1.0 - 2.4   Prothrombin Time    Collection Time: 11/06/19 12:50 PM   Result Value Ref Range    PROTIME 10.4 9.7 - 11.8 sec    INR 1.0    Partial Thromboplastin Time    Collection Time: 11/06/19 12:50 PM   Result Value Ref Range    PTT 24 22 - 32 sec   Troponin I Ultra Sensitive    Collection Time: 11/06/19 12:50 PM   Result Value Ref Range    TROPONIN I <0.02 <0.05 ng/mL   THYROID STIMULATING HORMONE    Collection Time: 11/06/19 12:50 PM   Result Value Ref Range    TSH 2.350 0.350 - 5.000 mcUnits/mL   Metered blood glucose    Collection Time: 11/06/19  3:26 PM   Result Value Ref Range    Glucose Bedside  (H) 70 - 99 mg/dL   Electrocardiogram 12-Lead    Collection Time: 11/06/19  5:00 PM   Result Value Ref Range    Ventricular Rate EKG/Min (BPM) 68     Atrial Rate (BPM) 69     IL-Interval (MSEC) 164     QRS-Interval (MSEC) 80     QT-Interval (MSEC) 388     QTc 413     P Axis (Degrees) 29     R Axis (Degrees) 64     T Axis (Degrees) 72     REPORT TEXT       Normal sinus rhythm  Normal ECG  No previous ECGs available     Basic Metabolic Panel    Collection Time:  11/07/19  6:04 AM   Result Value Ref Range    Sodium 141 135 - 145 mmol/L    Potassium 3.4 3.4 - 5.1 mmol/L    Chloride 105 98 - 107 mmol/L    Carbon Dioxide 28 21 - 32 mmol/L    Anion Gap 11 10 - 20 mmol/L    Glucose 129 (H) 65 - 99 mg/dL    BUN 19 6 - 20 mg/dL    Creatinine 0.65 0.51 - 0.95 mg/dL    GFR Estimate,  >90     GFR Estimate, Non African American 79     BUN/Creatinine Ratio 29 (H) 7 - 25    CALCIUM 9.1 8.4 - 10.2 mg/dL   Lipid Panel With Reflex    Collection Time: 11/07/19  6:04 AM   Result Value Ref Range    CHOLESTEROL 182 <200 mg/dL    CALCULATED  <130 mg/dL    HDL 56 >49 mg/dL    TRIGLYCERIDE 100 <150 mg/dL    CALCULATED NON  mg/dL    CHOL/HDL 3.3 <4.5         Assessment & Plan   Active Problems:    * No active hospital problems. *      Acute mental status change etiology uncertain at this time did have aphasia CT brain and CTA corrupted by motions, await MRI under sedation.  She has not reported to me recently for any kind of illness or upper respiratory infection.  CBC blood chemistry was unremarkable he had no fever currently no evidence of acute encephalitis.    Diabetes mellitus type 2 patient is n.p.o. mildly dehydrated put on Humalog sliding scale    IV fluid to maintain hydration    Resume oral antihypertensives when she is able to have oral intake    Further management depending on findings of MRI        Smoking Cessation  Counseling given: Not Answered      DVT Prophylaxis   Lovenox    Code Status    Code Status: Not on file    Primary Care Physician  Randolph Mills MD            TTE 8/5/24; Nl LV size & syst Fx EF 62%, Nl LV diastolic Fx, Nl RV size Fx, Nl L & R Atrial size, Mild-Mod MR, PASP 19

## 2024-10-08 NOTE — H&P ADULT - HISTORY OF PRESENT ILLNESS
HISTORY OF PRESENT ILLNESS:   63 year old gentleman with a past medical history significant for HTN, Nephrolithiasis and paroxysmal atrial fibrillation (eliquis), here for AFIB ablation.   ?  He originally was admitted to [facility] 7/2024 with symptomatic atrial fibrillation with RVR. He was rate controlled with Metoprolol and started on Eliquis 5 mg BID. Spontaneously converted to sinus rhythm. Stress test with no ischemia. He presented to [facility] 8/6/24 with complaint of dizziness and fatigue and was found to be back in AF @ 117 bpm on ECG. He spontaneously converted to SR. On Cartia. He was prescribed Flecainide 50 mg PRN by Dr. Padilla but has not needed to use it.  Compliant with Eliquis BID without bleeding issue. ?      PAST MEDICAL & SURGICAL HISTORY:  HTN (hypertension)    Nephrolithiasis    Anxiety    Atrial fibrillation    H/O lithotripsy      FAMILY HISTORY:  Family history of CVA (Father)        SOCIAL HISTORY:      Allergies  doxycycline (Hives)    	    REVIEW OF SYSTEMS:  CONSTITUTIONAL: No fever, weight loss, or fatigue  EYES: No eye pain, visual disturbances, or discharge  ENMT:  No difficulty hearing, tinnitus, vertigo; No sinus or throat pain  NECK: No pain or stiffness  RESPIRATORY: No cough, wheezing, chills or hemoptysis; No Shortness of Breath  CARDIOVASCULAR: See HPI   GASTROINTESTINAL: No abdominal or epigastric pain. No nausea, vomiting, or hematemesis; No diarrhea or constipation. No melena or hematochezia.  GENITOURINARY: + DYSURIA whenever he is nervous.   NEUROLOGICAL: No headaches, memory loss, loss of strength, numbness, or tremors  SKIN: No itching, burning, rashes, or lesions   LYMPH Nodes: No enlarged glands  ENDOCRINE: No heat or cold intolerance; No hair loss  MUSCULOSKELETAL: No joint pain or swelling; No muscle, back, or extremity pain  PSYCHIATRIC: No depression, anxiety, mood swings, or difficulty sleeping  HEME/LYMPH: No easy bruising, or bleeding gums  ALLERY AND IMMUNOLOGIC: No hives or eczema	      PHYSICAL EXAM:  Vital Signs Last 24 Hrs  T: 98F  HR 63 bpm  RR 18  /73  98% O2 sat on RA      Constitutional: NAD	  HEENT:   Normal oral mucosa, PERRL, EOMI	  Neck: No JVD  CVS: Normal S1 / S2, RRR, No murmurs  Pulm: CTA. No wheeze or rale  GI:  + BS, soft, NT / ND   Ext: No LE edema  Vascular: Peripheral pulses palpable 2+ bilaterally  MS: full range of motion in all joints  Neurologic: A&O x 3, Non-focal  Psych: Pleasant, has good insight  Skin: No rash or lesion       	  LABS:	                         13.7   6.27  )-----------( 185      ( 08 Oct 2024 11:30 )             41.0         EKG:  NSR today.

## 2024-10-08 NOTE — PATIENT PROFILE ADULT - FALL HARM RISK - HARM RISK INTERVENTIONS
None Assistance with ambulation/Assistance OOB with selected safe patient handling equipment/Communicate Risk of Fall with Harm to all staff/Monitor gait and stability/Reinforce activity limits and safety measures with patient and family/Sit up slowly, dangle for a short time, stand at bedside before walking/Tailored Fall Risk Interventions/Use of alarms - bed, chair and/or voice tab/Visual Cue: Yellow wristband and red socks/Bed in lowest position, wheels locked, appropriate side rails in place/Call bell, personal items and telephone in reach/Instruct patient to call for assistance before getting out of bed or chair/Non-slip footwear when patient is out of bed/[place] to call system/Physically safe environment - no spills, clutter or unnecessary equipment/Purposeful Proactive Rounding/Room/bathroom lighting operational, light cord in reach

## 2024-10-08 NOTE — PRE-ANESTHESIA EVALUATION ADULT - NSANTHOSAYNRD_GEN_A_CORE
No. MATTHIAS screening performed.  STOP BANG Legend: 0-2 = LOW Risk; 3-4 = INTERMEDIATE Risk; 5-8 = HIGH Risk

## 2024-10-08 NOTE — H&P ADULT - ASSESSMENT
63 year old gentleman with a past medical history significant for HTN, Nephrolithiasis and paroxysmal atrial fibrillation (eliquis), here for AFIB ablation. On eliquis without issue.   - NPO for AFIB ablation today   - Bedrest post procedure per protocol  - Continue A/C post procedure (6 hours post procedure)   - Continue home meds.   ?

## 2024-10-09 ENCOUNTER — TRANSCRIPTION ENCOUNTER (OUTPATIENT)
Age: 64
End: 2024-10-09

## 2024-10-09 VITALS — HEART RATE: 62 BPM | DIASTOLIC BLOOD PRESSURE: 60 MMHG | SYSTOLIC BLOOD PRESSURE: 106 MMHG

## 2024-10-09 LAB
ALBUMIN SERPL ELPH-MCNC: 3.7 G/DL — SIGNIFICANT CHANGE UP (ref 3.3–5)
ALP SERPL-CCNC: 54 U/L — SIGNIFICANT CHANGE UP (ref 40–120)
ALT FLD-CCNC: 12 U/L — SIGNIFICANT CHANGE UP (ref 10–45)
ANION GAP SERPL CALC-SCNC: 9 MMOL/L — SIGNIFICANT CHANGE UP (ref 5–17)
AST SERPL-CCNC: 70 U/L — HIGH (ref 10–40)
BILIRUB SERPL-MCNC: 1 MG/DL — SIGNIFICANT CHANGE UP (ref 0.2–1.2)
BUN SERPL-MCNC: 18 MG/DL — SIGNIFICANT CHANGE UP (ref 7–23)
CALCIUM SERPL-MCNC: 8.8 MG/DL — SIGNIFICANT CHANGE UP (ref 8.4–10.5)
CHLORIDE SERPL-SCNC: 104 MMOL/L — SIGNIFICANT CHANGE UP (ref 96–108)
CO2 SERPL-SCNC: 26 MMOL/L — SIGNIFICANT CHANGE UP (ref 22–31)
CREAT SERPL-MCNC: 0.97 MG/DL — SIGNIFICANT CHANGE UP (ref 0.5–1.3)
EGFR: 87 ML/MIN/1.73M2 — SIGNIFICANT CHANGE UP
GLUCOSE SERPL-MCNC: 126 MG/DL — HIGH (ref 70–99)
HAPTOGLOB SERPL-MCNC: 19 MG/DL — LOW (ref 34–200)
HCT VFR BLD CALC: 36.5 % — LOW (ref 39–50)
HGB BLD-MCNC: 12.1 G/DL — LOW (ref 13–17)
LDH SERPL L TO P-CCNC: 244 U/L — HIGH (ref 50–242)
MCHC RBC-ENTMCNC: 31.6 PG — SIGNIFICANT CHANGE UP (ref 27–34)
MCHC RBC-ENTMCNC: 33.2 GM/DL — SIGNIFICANT CHANGE UP (ref 32–36)
MCV RBC AUTO: 95.3 FL — SIGNIFICANT CHANGE UP (ref 80–100)
NRBC # BLD: 0 /100 WBCS — SIGNIFICANT CHANGE UP (ref 0–0)
PLATELET # BLD AUTO: 164 K/UL — SIGNIFICANT CHANGE UP (ref 150–400)
POTASSIUM SERPL-MCNC: 4.3 MMOL/L — SIGNIFICANT CHANGE UP (ref 3.5–5.3)
POTASSIUM SERPL-SCNC: 4.3 MMOL/L — SIGNIFICANT CHANGE UP (ref 3.5–5.3)
PROT SERPL-MCNC: 6 G/DL — SIGNIFICANT CHANGE UP (ref 6–8.3)
RBC # BLD: 3.83 M/UL — LOW (ref 4.2–5.8)
RBC # FLD: 12.7 % — SIGNIFICANT CHANGE UP (ref 10.3–14.5)
SODIUM SERPL-SCNC: 139 MMOL/L — SIGNIFICANT CHANGE UP (ref 135–145)
WBC # BLD: 5.95 K/UL — SIGNIFICANT CHANGE UP (ref 3.8–10.5)
WBC # FLD AUTO: 5.95 K/UL — SIGNIFICANT CHANGE UP (ref 3.8–10.5)

## 2024-10-09 PROCEDURE — 83010 ASSAY OF HAPTOGLOBIN QUANT: CPT

## 2024-10-09 PROCEDURE — C1893: CPT

## 2024-10-09 PROCEDURE — C1766: CPT

## 2024-10-09 PROCEDURE — 82947 ASSAY GLUCOSE BLOOD QUANT: CPT

## 2024-10-09 PROCEDURE — C1760: CPT

## 2024-10-09 PROCEDURE — 36415 COLL VENOUS BLD VENIPUNCTURE: CPT

## 2024-10-09 PROCEDURE — C1730: CPT

## 2024-10-09 PROCEDURE — C1759: CPT

## 2024-10-09 PROCEDURE — 85730 THROMBOPLASTIN TIME PARTIAL: CPT

## 2024-10-09 PROCEDURE — 84295 ASSAY OF SERUM SODIUM: CPT

## 2024-10-09 PROCEDURE — 82330 ASSAY OF CALCIUM: CPT

## 2024-10-09 PROCEDURE — 83615 LACTATE (LD) (LDH) ENZYME: CPT

## 2024-10-09 PROCEDURE — 85027 COMPLETE CBC AUTOMATED: CPT

## 2024-10-09 PROCEDURE — 82565 ASSAY OF CREATININE: CPT

## 2024-10-09 PROCEDURE — C1769: CPT

## 2024-10-09 PROCEDURE — 85610 PROTHROMBIN TIME: CPT

## 2024-10-09 PROCEDURE — 82803 BLOOD GASES ANY COMBINATION: CPT

## 2024-10-09 PROCEDURE — 86900 BLOOD TYPING SEROLOGIC ABO: CPT

## 2024-10-09 PROCEDURE — 85347 COAGULATION TIME ACTIVATED: CPT

## 2024-10-09 PROCEDURE — 86850 RBC ANTIBODY SCREEN: CPT

## 2024-10-09 PROCEDURE — C1733: CPT

## 2024-10-09 PROCEDURE — C1894: CPT

## 2024-10-09 PROCEDURE — 80053 COMPREHEN METABOLIC PANEL: CPT

## 2024-10-09 PROCEDURE — 86901 BLOOD TYPING SEROLOGIC RH(D): CPT

## 2024-10-09 PROCEDURE — 85014 HEMATOCRIT: CPT

## 2024-10-09 PROCEDURE — 84132 ASSAY OF SERUM POTASSIUM: CPT

## 2024-10-09 PROCEDURE — 83051 HEMOGLOBIN PLASMA: CPT

## 2024-10-09 RX ORDER — TAMSULOSIN HCL 0.4 MG
0.4 CAPSULE ORAL ONCE
Refills: 0 | Status: COMPLETED | OUTPATIENT
Start: 2024-10-09 | End: 2024-10-09

## 2024-10-09 RX ADMIN — Medication 0.4 MILLIGRAM(S): at 10:12

## 2024-10-09 RX ADMIN — APIXABAN 5 MILLIGRAM(S): 5 TABLET, FILM COATED ORAL at 05:47

## 2024-10-09 RX ADMIN — Medication 120 MILLIGRAM(S): at 05:47

## 2024-10-09 NOTE — DISCHARGE NOTE PROVIDER - CARE PROVIDER_API CALL
Socorro Young  Cardiac Electrophysiology  [address], [address] [place] [address]  Phone: [phone]  Fax: [phone]  Established Patient  Follow Up Time: 1 month

## 2024-10-09 NOTE — DISCHARGE NOTE PROVIDER - NSDCMRMEDTOKEN_GEN_ALL_CORE_FT
Cartia  mg/24 hours oral capsule, extended release: 1 cap(s) orally once a day  Eliquis 5 mg oral tablet: 1 tab(s) orally 2 times a day   3

## 2024-10-09 NOTE — DISCHARGE NOTE PROVIDER - HOSPITAL COURSE
Subjective:    pt is s/p successful AF ablation  vitals stable overnight  telemetry shows sinus rhythm  patient feeling well no complaints.     denies chest pain, palpitations, dizziness, syncope, nausea, vomiting, diarrhea, groin pain, facial droop or extremity weakness.     Inpatient Medications:   apixaban 5 milliGRAM(s) Oral two times a day  diltiazem    milliGRAM(s) Oral daily  influenza  Vaccine (HIGH DOSE) 0.5 milliLiter(s) IntraMuscular once  traZODone 100 milliGRAM(s) Oral at bedtime      Allergies: doxycycline (Hives)      ROS:   CONSTITUTIONAL: No fever, weight loss + fatigue  EYES: Pt denies  RESPIRATORY: No cough, wheezing, chills or hemoptysis; No Shortness of Breath  CARDIOVASCULAR: see HPI  GASTROINTESTINAL: Pt denies  NEUROLOGICAL: Pt denies  SKIN: Pt denies   PSYCHIATRIC: Pt denies  HEME/LYMPH: Pt denies    PHYSICAL:  T(C): 36.8 (10-09-24 @ 05:34), Max: 36.8 (10-08-24 @ 20:52)  HR: 63 (10-09-24 @ 05:34) (63 - 66)  BP: 126/61 (10-09-24 @ 05:34) (109/61 - 126/61)  RR: 17 (10-09-24 @ 05:34) (17 - 18)  SpO2: 95% (10-09-24 @ 05:34) (95% - 97%)  Appearance: No acute distress, well developed  Eyes: normal appearing conjunctiva, pupils and eyelids  Cardiovascular: Normal S1 S2, No JVD, No murmurs, No edema  Respiratory: Lungs clear to auscultation	bilaterally.  No wheeze, rhonchi, rales noted  Gastrointestinal:  Soft, NT/ND 	  Neurologic:  No deficit noted  Psych: A&Ox3, normal mood/affect  Extremities: b/l groin soft nontender no bleeding or hematoma.        LABS:                        12.1   5.95  )-----------( 164      ( 09 Oct 2024 05:30 )             36.5     10-09    139  |  104  |  18  ----------------------------<  126[H]  4.3   |  26  |  0.97    Ca    8.8      09 Oct 2024 05:30    TPro  6.0  /  Alb  3.7  /  TBili  1.0  /  DBili  x   /  AST  70[H]  /  ALT  12  /  AlkPhos  54  10-09    PT/INR - ( 08 Oct 2024 11:30 )   PT: 15.6 sec;   INR: 1.34          PTT - ( 08 Oct 2024 11:30 )  PTT:37.7 sec      Assessment Plan:  63 M HTN, Nephrolithiasis and pAF (eliquis), 10/8: s/p AFIB ablation.    -cont uninterrupted ac with eliquis  -cont dilt  -post procedure instructions provided to the patient  -ok for dc home today from EP standpoint       Subjective:    pt is s/p successful AF ablation  vitals stable overnight  telemetry shows sinus rhythm  patient feeling well, had some urinary retention which improved after tamsulosin 0.4mg x 1 dose. Pt has voided 300cc+ since then and had some coffee.     pt also reported some 2/10-3/10 chest pain which is intermittent and positional.    denies chest pain, palpitations, dizziness, syncope, nausea, vomiting, diarrhea, groin pain, facial droop or extremity weakness.     Inpatient Medications:   apixaban 5 milliGRAM(s) Oral two times a day  diltiazem    milliGRAM(s) Oral daily  influenza  Vaccine (HIGH DOSE) 0.5 milliLiter(s) IntraMuscular once  traZODone 100 milliGRAM(s) Oral at bedtime      Allergies: doxycycline (Hives)      ROS:   CONSTITUTIONAL: No fever, weight loss + fatigue  EYES: Pt denies  RESPIRATORY: No cough, wheezing, chills or hemoptysis; No Shortness of Breath  CARDIOVASCULAR: see HPI  GASTROINTESTINAL: Pt denies  NEUROLOGICAL: Pt denies  SKIN: Pt denies   PSYCHIATRIC: Pt denies  HEME/LYMPH: Pt denies    PHYSICAL:  T(C): 36.8 (10-09-24 @ 05:34), Max: 36.8 (10-08-24 @ 20:52)  HR: 63 (10-09-24 @ 05:34) (63 - 66)  BP: 126/61 (10-09-24 @ 05:34) (109/61 - 126/61)  RR: 17 (10-09-24 @ 05:34) (17 - 18)  SpO2: 95% (10-09-24 @ 05:34) (95% - 97%)  Appearance: No acute distress, well developed  Eyes: normal appearing conjunctiva, pupils and eyelids  Cardiovascular: Normal S1 S2, No JVD, No murmurs, No edema  Respiratory: Lungs clear to auscultation	bilaterally.  No wheeze, rhonchi, rales noted  Gastrointestinal:  Soft, NT/ND 	  Neurologic:  No deficit noted  Psych: A&Ox3, normal mood/affect  Extremities: b/l groin soft nontender no bleeding or hematoma.        LABS:                        12.1   5.95  )-----------( 164      ( 09 Oct 2024 05:30 )             36.5     10-09    139  |  104  |  18  ----------------------------<  126[H]  4.3   |  26  |  0.97    Ca    8.8      09 Oct 2024 05:30    TPro  6.0  /  Alb  3.7  /  TBili  1.0  /  DBili  x   /  AST  70[H]  /  ALT  12  /  AlkPhos  54  10-09    PT/INR - ( 08 Oct 2024 11:30 )   PT: 15.6 sec;   INR: 1.34          PTT - ( 08 Oct 2024 11:30 )  PTT:37.7 sec      Assessment Plan:  63 M HTN, Nephrolithiasis and pAF (eliquis), 10/8: s/p AFIB ablation.    -cont uninterrupted ac with eliquis  -cont dilt  -post procedure instructions provided to the patient  -ok for dc home today from EP standpoint  -pt with mild CP likely pericarditic, for now no Rx needed but patient will inform me if symptoms persistent/ worsen.

## 2024-10-09 NOTE — DISCHARGE NOTE NURSING/CASE MANAGEMENT/SOCIAL WORK - PATIENT PORTAL LINK FT
You can access the FollowMyHealth Patient Portal offered by [facility] by registering at the following website: [email or web address]. By joining [facility]’s FollowMyHealth portal, you will also be able to view your health information using other applications (apps) compatible with our system.

## 2024-10-11 LAB
HGB FREE PLAS-MCNC: 136.8 MG/DL — HIGH (ref 0–4.9)
HGB FREE PLAS-MCNC: 4.4 MG/DL — SIGNIFICANT CHANGE UP (ref 0–4.9)
ISTAT ACTK (ACTIVATED CLOTTING TIME KAOLIN): 287 SEC — HIGH (ref 74–137)
ISTAT ACTK (ACTIVATED CLOTTING TIME KAOLIN): 293 SEC — HIGH (ref 74–137)
ISTAT ACTK (ACTIVATED CLOTTING TIME KAOLIN): 299 SEC — HIGH (ref 74–137)

## 2024-10-14 DIAGNOSIS — Z88.1 ALLERGY STATUS TO OTHER ANTIBIOTIC AGENTS: ICD-10-CM

## 2024-10-14 DIAGNOSIS — Z79.01 LONG TERM (CURRENT) USE OF ANTICOAGULANTS: ICD-10-CM

## 2024-10-14 DIAGNOSIS — R33.9 RETENTION OF URINE, UNSPECIFIED: ICD-10-CM

## 2024-10-14 DIAGNOSIS — I48.0 PAROXYSMAL ATRIAL FIBRILLATION: ICD-10-CM

## 2024-10-14 DIAGNOSIS — I10 ESSENTIAL (PRIMARY) HYPERTENSION: ICD-10-CM

## 2024-10-14 DIAGNOSIS — R07.89 OTHER CHEST PAIN: ICD-10-CM

## 2024-10-14 DIAGNOSIS — I48.91 UNSPECIFIED ATRIAL FIBRILLATION: ICD-10-CM

## 2024-11-04 ENCOUNTER — APPOINTMENT (OUTPATIENT)
Dept: HEART AND VASCULAR | Facility: CLINIC | Age: 64
End: 2024-11-04
Payer: MEDICARE

## 2024-11-04 ENCOUNTER — NON-APPOINTMENT (OUTPATIENT)
Age: 64
End: 2024-11-04

## 2024-11-04 VITALS — BODY MASS INDEX: 21.26 KG/M2 | WEIGHT: 157 LBS | HEIGHT: 72 IN

## 2024-11-04 VITALS — OXYGEN SATURATION: 99 %

## 2024-11-04 VITALS — TEMPERATURE: 97.3 F | HEART RATE: 56 BPM | DIASTOLIC BLOOD PRESSURE: 74 MMHG | SYSTOLIC BLOOD PRESSURE: 116 MMHG

## 2024-11-04 PROCEDURE — 93000 ELECTROCARDIOGRAM COMPLETE: CPT

## 2024-11-04 PROCEDURE — 99213 OFFICE O/P EST LOW 20 MIN: CPT

## 2024-11-04 PROCEDURE — G2211 COMPLEX E/M VISIT ADD ON: CPT

## 2024-11-20 PROCEDURE — A9585: CPT

## 2024-11-20 PROCEDURE — 70553 MRI BRAIN STEM W/O & W/DYE: CPT

## 2024-12-23 ENCOUNTER — RX RENEWAL (OUTPATIENT)
Age: 64
End: 2024-12-23

## 2024-12-23 RX ORDER — DILTIAZEM HYDROCHLORIDE 120 MG/1
120 CAPSULE, EXTENDED RELEASE ORAL
Qty: 30 | Refills: 0 | Status: ACTIVE | COMMUNITY
Start: 2024-12-23 | End: 1900-01-01

## 2025-02-20 ENCOUNTER — NON-APPOINTMENT (OUTPATIENT)
Age: 65
End: 2025-02-20

## 2025-02-20 ENCOUNTER — APPOINTMENT (OUTPATIENT)
Dept: HEART AND VASCULAR | Facility: CLINIC | Age: 65
End: 2025-02-20
Payer: MEDICARE

## 2025-02-20 VITALS
HEIGHT: 72 IN | DIASTOLIC BLOOD PRESSURE: 70 MMHG | HEART RATE: 60 BPM | WEIGHT: 153 LBS | SYSTOLIC BLOOD PRESSURE: 110 MMHG | BODY MASS INDEX: 20.72 KG/M2 | TEMPERATURE: 98 F | OXYGEN SATURATION: 97 %

## 2025-02-20 DIAGNOSIS — I48.0 PAROXYSMAL ATRIAL FIBRILLATION: ICD-10-CM

## 2025-02-20 DIAGNOSIS — F41.9 ANXIETY DISORDER, UNSPECIFIED: ICD-10-CM

## 2025-02-20 DIAGNOSIS — I34.0 NONRHEUMATIC MITRAL (VALVE) INSUFFICIENCY: ICD-10-CM

## 2025-02-20 PROCEDURE — G2211 COMPLEX E/M VISIT ADD ON: CPT

## 2025-02-20 PROCEDURE — 99214 OFFICE O/P EST MOD 30 MIN: CPT

## 2025-02-20 PROCEDURE — 93000 ELECTROCARDIOGRAM COMPLETE: CPT

## 2025-02-20 RX ORDER — BUSPIRONE HCL 5 MG
TABLET ORAL
Refills: 0 | Status: ACTIVE | COMMUNITY

## 2025-02-20 RX ORDER — PROPRANOLOL HYDROCHLORIDE 10 MG/1
10 TABLET ORAL
Qty: 90 | Refills: 3 | Status: ACTIVE | COMMUNITY
Start: 2025-02-20 | End: 1900-01-01

## 2025-05-05 ENCOUNTER — APPOINTMENT (OUTPATIENT)
Dept: HEART AND VASCULAR | Facility: CLINIC | Age: 65
End: 2025-05-05
Payer: MEDICARE

## 2025-05-05 ENCOUNTER — NON-APPOINTMENT (OUTPATIENT)
Age: 65
End: 2025-05-05

## 2025-05-05 VITALS — HEIGHT: 72 IN | BODY MASS INDEX: 19.91 KG/M2 | TEMPERATURE: 97.5 F | WEIGHT: 147 LBS

## 2025-05-05 VITALS — SYSTOLIC BLOOD PRESSURE: 110 MMHG | HEART RATE: 53 BPM | DIASTOLIC BLOOD PRESSURE: 66 MMHG

## 2025-05-05 DIAGNOSIS — I48.0 PAROXYSMAL ATRIAL FIBRILLATION: ICD-10-CM

## 2025-05-05 PROCEDURE — 99214 OFFICE O/P EST MOD 30 MIN: CPT

## 2025-05-05 PROCEDURE — 93000 ELECTROCARDIOGRAM COMPLETE: CPT

## 2025-06-04 PROCEDURE — 93246 EXT ECG>7D<15D RECORDING: CPT

## 2025-06-10 ENCOUNTER — APPOINTMENT (OUTPATIENT)
Dept: HEART AND VASCULAR | Facility: CLINIC | Age: 65
End: 2025-06-10
Payer: MEDICARE

## 2025-06-10 VITALS
TEMPERATURE: 98.5 F | HEIGHT: 72 IN | OXYGEN SATURATION: 98 % | WEIGHT: 148 LBS | BODY MASS INDEX: 20.05 KG/M2 | DIASTOLIC BLOOD PRESSURE: 60 MMHG | SYSTOLIC BLOOD PRESSURE: 110 MMHG | HEART RATE: 59 BPM

## 2025-06-10 PROCEDURE — 93000 ELECTROCARDIOGRAM COMPLETE: CPT

## 2025-06-10 PROCEDURE — 99214 OFFICE O/P EST MOD 30 MIN: CPT

## 2025-06-18 ENCOUNTER — APPOINTMENT (OUTPATIENT)
Dept: HEART AND VASCULAR | Facility: CLINIC | Age: 65
End: 2025-06-18
Payer: MEDICARE

## 2025-06-18 PROCEDURE — 93306 TTE W/DOPPLER COMPLETE: CPT

## 2025-06-19 ENCOUNTER — APPOINTMENT (OUTPATIENT)
Dept: HEART AND VASCULAR | Facility: CLINIC | Age: 65
End: 2025-06-19
Payer: MEDICARE

## 2025-06-19 VITALS
TEMPERATURE: 98.6 F | HEART RATE: 60 BPM | BODY MASS INDEX: 19.91 KG/M2 | DIASTOLIC BLOOD PRESSURE: 62 MMHG | SYSTOLIC BLOOD PRESSURE: 114 MMHG | WEIGHT: 146.98 LBS | HEIGHT: 72 IN | OXYGEN SATURATION: 98 %

## 2025-06-19 PROBLEM — R06.02 SHORTNESS OF BREATH AT REST: Status: ACTIVE | Noted: 2025-06-19

## 2025-06-19 PROCEDURE — G2211 COMPLEX E/M VISIT ADD ON: CPT

## 2025-06-19 PROCEDURE — 99213 OFFICE O/P EST LOW 20 MIN: CPT

## 2025-06-28 PROCEDURE — 93248 EXT ECG>7D<15D REV&INTERPJ: CPT

## 2025-08-21 ENCOUNTER — APPOINTMENT (OUTPATIENT)
Dept: HEART AND VASCULAR | Facility: CLINIC | Age: 65
End: 2025-08-21